# Patient Record
Sex: FEMALE | Race: WHITE | NOT HISPANIC OR LATINO | URBAN - METROPOLITAN AREA
[De-identification: names, ages, dates, MRNs, and addresses within clinical notes are randomized per-mention and may not be internally consistent; named-entity substitution may affect disease eponyms.]

---

## 2018-01-22 ENCOUNTER — EMERGENCY (EMERGENCY)
Facility: HOSPITAL | Age: 28
LOS: 0 days | Discharge: HOME | End: 2018-01-22
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
## 2018-01-22 DIAGNOSIS — S39.012A STRAIN OF MUSCLE, FASCIA AND TENDON OF LOWER BACK, INITIAL ENCOUNTER: ICD-10-CM

## 2018-01-22 DIAGNOSIS — M54.5 LOW BACK PAIN: ICD-10-CM

## 2018-02-01 ENCOUNTER — TRANSCRIPTION ENCOUNTER (OUTPATIENT)
Age: 28
End: 2018-02-01

## 2018-11-12 ENCOUNTER — APPOINTMENT (OUTPATIENT)
Dept: OBGYN | Facility: CLINIC | Age: 28
End: 2018-11-12

## 2018-11-12 ENCOUNTER — OUTPATIENT (OUTPATIENT)
Dept: OUTPATIENT SERVICES | Facility: HOSPITAL | Age: 28
LOS: 1 days | Discharge: HOME | End: 2018-11-12

## 2018-11-12 VITALS
SYSTOLIC BLOOD PRESSURE: 110 MMHG | DIASTOLIC BLOOD PRESSURE: 70 MMHG | WEIGHT: 120 LBS | HEIGHT: 60 IN | BODY MASS INDEX: 23.56 KG/M2

## 2018-11-12 DIAGNOSIS — E03.9 HYPOTHYROIDISM, UNSPECIFIED: ICD-10-CM

## 2018-11-12 DIAGNOSIS — Z78.9 OTHER SPECIFIED HEALTH STATUS: ICD-10-CM

## 2018-11-12 DIAGNOSIS — Z00.00 ENCOUNTER FOR GENERAL ADULT MEDICAL EXAMINATION WITHOUT ABNORMAL FINDINGS: ICD-10-CM

## 2018-11-12 DIAGNOSIS — S31.40XA UNSPECIFIED OPEN WOUND OF VAGINA AND VULVA, INITIAL ENCOUNTER: ICD-10-CM

## 2018-11-12 RX ORDER — BENZETHONIUM CHLORIDE 0.2 %
20-0.5 FILM-FORMING LIQUID WITH APPLICATOR TOPICAL
Qty: 1 | Refills: 0 | Status: ACTIVE | COMMUNITY
Start: 2018-11-12 | End: 1900-01-01

## 2018-11-12 RX ORDER — IBUPROFEN 600 MG/1
600 TABLET, FILM COATED ORAL EVERY 6 HOURS
Qty: 20 | Refills: 0 | Status: ACTIVE | COMMUNITY
Start: 2018-11-12 | End: 1900-01-01

## 2018-11-13 PROBLEM — Z78.9 NON-SMOKER: Status: ACTIVE | Noted: 2018-11-13

## 2018-11-13 PROBLEM — E03.9 HYPOTHYROIDISM: Status: ACTIVE | Noted: 2018-11-13

## 2018-11-18 LAB — BACTERIA GENITAL AEROBE CULT: NORMAL

## 2018-12-10 LAB
A VAGINAE DNA VAG QL NAA+PROBE: NORMAL
BVAB2 DNA VAG QL NAA+PROBE: NORMAL
C KRUSEI DNA VAG QL NAA+PROBE: NORMAL
C TRACH RRNA SPEC QL NAA+PROBE: POSITIVE
MEGA1 DNA VAG QL NAA+PROBE: NORMAL
N GONORRHOEA RRNA SPEC QL NAA+PROBE: NEGATIVE
T VAGINALIS RRNA SPEC QL NAA+PROBE: NEGATIVE

## 2019-03-24 ENCOUNTER — LABORATORY RESULT (OUTPATIENT)
Age: 29
End: 2019-03-24

## 2019-03-25 ENCOUNTER — LABORATORY RESULT (OUTPATIENT)
Age: 29
End: 2019-03-25

## 2019-03-25 ENCOUNTER — OUTPATIENT (OUTPATIENT)
Dept: OUTPATIENT SERVICES | Facility: HOSPITAL | Age: 29
LOS: 1 days | Discharge: HOME | End: 2019-03-25

## 2019-03-25 ENCOUNTER — APPOINTMENT (OUTPATIENT)
Dept: OBGYN | Facility: CLINIC | Age: 29
End: 2019-03-25

## 2019-03-25 VITALS
BODY MASS INDEX: 22.58 KG/M2 | DIASTOLIC BLOOD PRESSURE: 74 MMHG | SYSTOLIC BLOOD PRESSURE: 122 MMHG | WEIGHT: 115 LBS | HEIGHT: 60 IN | HEART RATE: 82 BPM

## 2019-03-25 DIAGNOSIS — Z00.00 ENCOUNTER FOR GENERAL ADULT MEDICAL EXAMINATION WITHOUT ABNORMAL FINDINGS: ICD-10-CM

## 2019-03-25 DIAGNOSIS — Z00.00 ENCOUNTER FOR GENERAL ADULT MEDICAL EXAMINATION W/OUT ABNORMAL FINDINGS: ICD-10-CM

## 2019-03-25 RX ORDER — VALACYCLOVIR 500 MG/1
500 TABLET, FILM COATED ORAL DAILY
Qty: 30 | Refills: 11 | Status: ACTIVE | COMMUNITY
Start: 2019-03-25 | End: 1900-01-01

## 2019-03-25 RX ORDER — VALACYCLOVIR 500 MG/1
500 TABLET, FILM COATED ORAL TWICE DAILY
Qty: 10 | Refills: 0 | Status: ACTIVE | COMMUNITY
Start: 2019-03-25 | End: 1900-01-01

## 2019-03-25 NOTE — PHYSICAL EXAM
[Normal] : cervix [Motion Tenderness] : there was no cervical motion tenderness [Ulcer ___ cm] : a [unfilled] ~Ucm ulcer [FreeTextEntry5] : no lesions

## 2019-03-27 ENCOUNTER — LABORATORY RESULT (OUTPATIENT)
Age: 29
End: 2019-03-27

## 2019-03-27 ENCOUNTER — OUTPATIENT (OUTPATIENT)
Dept: OUTPATIENT SERVICES | Facility: HOSPITAL | Age: 29
LOS: 1 days | Discharge: HOME | End: 2019-03-27

## 2019-03-27 DIAGNOSIS — Z00.00 ENCOUNTER FOR GENERAL ADULT MEDICAL EXAMINATION WITHOUT ABNORMAL FINDINGS: ICD-10-CM

## 2019-04-08 ENCOUNTER — APPOINTMENT (OUTPATIENT)
Dept: OBGYN | Facility: CLINIC | Age: 29
End: 2019-04-08

## 2019-07-12 ENCOUNTER — TRANSCRIPTION ENCOUNTER (OUTPATIENT)
Age: 29
End: 2019-07-12

## 2021-07-06 ENCOUNTER — TRANSCRIPTION ENCOUNTER (OUTPATIENT)
Age: 31
End: 2021-07-06

## 2021-07-08 ENCOUNTER — NON-APPOINTMENT (OUTPATIENT)
Age: 31
End: 2021-07-08

## 2021-07-08 ENCOUNTER — APPOINTMENT (OUTPATIENT)
Dept: OBGYN | Facility: CLINIC | Age: 31
End: 2021-07-08
Payer: COMMERCIAL

## 2021-07-08 ENCOUNTER — RESULT REVIEW (OUTPATIENT)
Age: 31
End: 2021-07-08

## 2021-07-08 VITALS
HEIGHT: 60 IN | DIASTOLIC BLOOD PRESSURE: 60 MMHG | WEIGHT: 115 LBS | SYSTOLIC BLOOD PRESSURE: 115 MMHG | BODY MASS INDEX: 22.58 KG/M2

## 2021-07-08 PROCEDURE — 99072 ADDL SUPL MATRL&STAF TM PHE: CPT

## 2021-07-08 PROCEDURE — 99395 PREV VISIT EST AGE 18-39: CPT

## 2021-07-08 RX ORDER — PRENATAL VIT NO.130/IRON/FOLIC 27MG-0.8MG
27-0.8 TABLET ORAL DAILY
Qty: 84 | Refills: 3 | Status: ACTIVE | COMMUNITY
Start: 2021-07-08 | End: 1900-01-01

## 2021-07-14 NOTE — HISTORY OF PRESENT ILLNESS
[FreeTextEntry1] : 32 y/o G0 LMP 6/25/21 here for routine annual as well as axillary mass which she noticed two days ago. At the time which she noticed it, she went to an urgent care center. They told her that she should have breast imaging done and follow up with a breast surgeon. Patient states that since then the lump has regressed and pain has resolved. She can't feel it anymore. She is not using any birth control as she desires to conceive. She has history of hypothyroidism but hasn't seen an endocrinologist in a long time. No other complaints. [Patient reported PAP Smear was normal] : Patient reported PAP Smear was normal [Y] : Patient is sexually active [N] : Patient denies prior pregnancies [PapSmeardate] : 11/9/18 [TextBox_31] : NILM/HPV NEG [LMPDate] : 6/25/21 [MensesFreq] : 28

## 2021-07-14 NOTE — PHYSICAL EXAM
[Appropriately responsive] : appropriately responsive [Alert] : alert [No Acute Distress] : no acute distress [Soft] : soft [Non-tender] : non-tender [Non-distended] : non-distended [Examination Of The Breasts] : a normal appearance [No Masses] : no breast masses were palpable [Normal] : normal [Uterine Adnexae] : normal

## 2021-07-14 NOTE — DISCUSSION/SUMMARY
[FreeTextEntry1] : 30 y/o G0 with normal annual and resolved breast mass\par - Send for breast ultrasound/mammogram for further assessment\par - Pap/HPV done\par - Aptima swab and STI panel sent\par - TSH/Free T4 ordered\par -Endocrinology referral given\par - Prenatal vitamins prescribed\par - RTc 1 year or prn

## 2021-07-20 LAB
A VAGINAE DNA VAG QL NAA+PROBE: NORMAL
BVAB2 DNA VAG QL NAA+PROBE: NORMAL
C KRUSEI DNA VAG QL NAA+PROBE: NEGATIVE
C TRACH RRNA SPEC QL NAA+PROBE: NEGATIVE
CYTOLOGY CVX/VAG DOC THIN PREP: NORMAL
HPV HIGH+LOW RISK DNA PNL CVX: NOT DETECTED
MEGA1 DNA VAG QL NAA+PROBE: NORMAL
N GONORRHOEA RRNA SPEC QL NAA+PROBE: NEGATIVE
T VAGINALIS RRNA SPEC QL NAA+PROBE: NEGATIVE

## 2021-08-02 ENCOUNTER — RESULT REVIEW (OUTPATIENT)
Age: 31
End: 2021-08-02

## 2021-08-02 ENCOUNTER — OUTPATIENT (OUTPATIENT)
Dept: OUTPATIENT SERVICES | Facility: HOSPITAL | Age: 31
LOS: 1 days | Discharge: HOME | End: 2021-08-02
Payer: COMMERCIAL

## 2021-08-02 DIAGNOSIS — R92.8 OTHER ABNORMAL AND INCONCLUSIVE FINDINGS ON DIAGNOSTIC IMAGING OF BREAST: ICD-10-CM

## 2021-08-02 PROCEDURE — G0279: CPT | Mod: 26

## 2021-08-02 PROCEDURE — 76641 ULTRASOUND BREAST COMPLETE: CPT | Mod: 26,50

## 2021-08-02 PROCEDURE — 77066 DX MAMMO INCL CAD BI: CPT | Mod: 26

## 2022-06-30 ENCOUNTER — APPOINTMENT (OUTPATIENT)
Dept: OBGYN | Facility: CLINIC | Age: 32
End: 2022-06-30

## 2022-06-30 VITALS
HEIGHT: 60 IN | DIASTOLIC BLOOD PRESSURE: 70 MMHG | SYSTOLIC BLOOD PRESSURE: 120 MMHG | WEIGHT: 122 LBS | BODY MASS INDEX: 23.95 KG/M2

## 2022-06-30 DIAGNOSIS — N91.1 SECONDARY AMENORRHEA: ICD-10-CM

## 2022-06-30 PROCEDURE — 76815 OB US LIMITED FETUS(S): CPT

## 2022-06-30 PROCEDURE — 81025 URINE PREGNANCY TEST: CPT

## 2022-06-30 PROCEDURE — 99213 OFFICE O/P EST LOW 20 MIN: CPT | Mod: 25

## 2022-06-30 RX ORDER — PYRIDOXINE HCL (VITAMIN B6) 25 MG
25 TABLET ORAL
Qty: 60 | Refills: 3 | Status: ACTIVE | COMMUNITY
Start: 2022-06-30 | End: 1900-01-01

## 2022-06-30 RX ORDER — DOXYLAMINE SUCCINATE 25 MG/1
25 TABLET ORAL
Qty: 30 | Refills: 2 | Status: ACTIVE | COMMUNITY
Start: 2022-06-30 | End: 1900-01-01

## 2022-07-01 ENCOUNTER — EMERGENCY (EMERGENCY)
Facility: HOSPITAL | Age: 32
LOS: 0 days | Discharge: HOME | End: 2022-07-01
Attending: EMERGENCY MEDICINE | Admitting: EMERGENCY MEDICINE

## 2022-07-01 VITALS
HEART RATE: 116 BPM | WEIGHT: 121.92 LBS | SYSTOLIC BLOOD PRESSURE: 131 MMHG | DIASTOLIC BLOOD PRESSURE: 81 MMHG | TEMPERATURE: 98 F | RESPIRATION RATE: 18 BRPM | OXYGEN SATURATION: 98 %

## 2022-07-01 VITALS
HEART RATE: 84 BPM | OXYGEN SATURATION: 98 % | TEMPERATURE: 98 F | DIASTOLIC BLOOD PRESSURE: 53 MMHG | RESPIRATION RATE: 19 BRPM | SYSTOLIC BLOOD PRESSURE: 111 MMHG

## 2022-07-01 DIAGNOSIS — E03.9 HYPOTHYROIDISM, UNSPECIFIED: ICD-10-CM

## 2022-07-01 DIAGNOSIS — O20.9 HEMORRHAGE IN EARLY PREGNANCY, UNSPECIFIED: ICD-10-CM

## 2022-07-01 DIAGNOSIS — R10.30 LOWER ABDOMINAL PAIN, UNSPECIFIED: ICD-10-CM

## 2022-07-01 DIAGNOSIS — O03.9 COMPLETE OR UNSPECIFIED SPONTANEOUS ABORTION WITHOUT COMPLICATION: ICD-10-CM

## 2022-07-01 LAB
ABO RH CONFIRMATION: SIGNIFICANT CHANGE UP
ALBUMIN SERPL ELPH-MCNC: 4.1 G/DL — SIGNIFICANT CHANGE UP (ref 3.5–5.2)
ALP SERPL-CCNC: 36 U/L — SIGNIFICANT CHANGE UP (ref 30–115)
ALT FLD-CCNC: 15 U/L — SIGNIFICANT CHANGE UP (ref 0–41)
ANION GAP SERPL CALC-SCNC: 15 MMOL/L — HIGH (ref 7–14)
APPEARANCE UR: CLEAR — SIGNIFICANT CHANGE UP
AST SERPL-CCNC: 22 U/L — SIGNIFICANT CHANGE UP (ref 0–41)
BACTERIA # UR AUTO: NEGATIVE — SIGNIFICANT CHANGE UP
BASOPHILS # BLD AUTO: 0.04 K/UL — SIGNIFICANT CHANGE UP (ref 0–0.2)
BASOPHILS NFR BLD AUTO: 0.4 % — SIGNIFICANT CHANGE UP (ref 0–1)
BILIRUB SERPL-MCNC: <0.2 MG/DL — SIGNIFICANT CHANGE UP (ref 0.2–1.2)
BILIRUB UR-MCNC: NEGATIVE — SIGNIFICANT CHANGE UP
BLD GP AB SCN SERPL QL: SIGNIFICANT CHANGE UP
BUN SERPL-MCNC: 10 MG/DL — SIGNIFICANT CHANGE UP (ref 10–20)
CALCIUM SERPL-MCNC: 9 MG/DL — SIGNIFICANT CHANGE UP (ref 8.5–10.1)
CHLORIDE SERPL-SCNC: 97 MMOL/L — LOW (ref 98–110)
CO2 SERPL-SCNC: 22 MMOL/L — SIGNIFICANT CHANGE UP (ref 17–32)
COLOR SPEC: COLORLESS — SIGNIFICANT CHANGE UP
CREAT SERPL-MCNC: 0.7 MG/DL — SIGNIFICANT CHANGE UP (ref 0.7–1.5)
DIFF PNL FLD: ABNORMAL
EGFR: 118 ML/MIN/1.73M2 — SIGNIFICANT CHANGE UP
EOSINOPHIL # BLD AUTO: 0.21 K/UL — SIGNIFICANT CHANGE UP (ref 0–0.7)
EOSINOPHIL NFR BLD AUTO: 2 % — SIGNIFICANT CHANGE UP (ref 0–8)
EPI CELLS # UR: 1 /HPF — SIGNIFICANT CHANGE UP (ref 0–5)
GLUCOSE SERPL-MCNC: 110 MG/DL — HIGH (ref 70–99)
GLUCOSE UR QL: SIGNIFICANT CHANGE UP
HCG SERPL-ACNC: 4121 MIU/ML — HIGH
HCT VFR BLD CALC: 36.6 % — LOW (ref 37–47)
HGB BLD-MCNC: 12.4 G/DL — SIGNIFICANT CHANGE UP (ref 12–16)
HYALINE CASTS # UR AUTO: 3 /LPF — SIGNIFICANT CHANGE UP (ref 0–7)
IMM GRANULOCYTES NFR BLD AUTO: 0.6 % — HIGH (ref 0.1–0.3)
KETONES UR-MCNC: NEGATIVE — SIGNIFICANT CHANGE UP
LEUKOCYTE ESTERASE UR-ACNC: NEGATIVE — SIGNIFICANT CHANGE UP
LYMPHOCYTES # BLD AUTO: 1.19 K/UL — LOW (ref 1.2–3.4)
LYMPHOCYTES # BLD AUTO: 11.5 % — LOW (ref 20.5–51.1)
MCHC RBC-ENTMCNC: 26.9 PG — LOW (ref 27–31)
MCHC RBC-ENTMCNC: 33.9 G/DL — SIGNIFICANT CHANGE UP (ref 32–37)
MCV RBC AUTO: 79.4 FL — LOW (ref 81–99)
MONOCYTES # BLD AUTO: 0.79 K/UL — HIGH (ref 0.1–0.6)
MONOCYTES NFR BLD AUTO: 7.6 % — SIGNIFICANT CHANGE UP (ref 1.7–9.3)
NEUTROPHILS # BLD AUTO: 8.07 K/UL — HIGH (ref 1.4–6.5)
NEUTROPHILS NFR BLD AUTO: 77.9 % — HIGH (ref 42.2–75.2)
NITRITE UR-MCNC: NEGATIVE — SIGNIFICANT CHANGE UP
NRBC # BLD: 0 /100 WBCS — SIGNIFICANT CHANGE UP (ref 0–0)
PH UR: 6 — SIGNIFICANT CHANGE UP (ref 5–8)
PLATELET # BLD AUTO: 247 K/UL — SIGNIFICANT CHANGE UP (ref 130–400)
POTASSIUM SERPL-MCNC: 4 MMOL/L — SIGNIFICANT CHANGE UP (ref 3.5–5)
POTASSIUM SERPL-SCNC: 4 MMOL/L — SIGNIFICANT CHANGE UP (ref 3.5–5)
PROT SERPL-MCNC: 6.5 G/DL — SIGNIFICANT CHANGE UP (ref 6–8)
PROT UR-MCNC: NEGATIVE — SIGNIFICANT CHANGE UP
RBC # BLD: 4.61 M/UL — SIGNIFICANT CHANGE UP (ref 4.2–5.4)
RBC # FLD: 13.9 % — SIGNIFICANT CHANGE UP (ref 11.5–14.5)
RBC CASTS # UR COMP ASSIST: 5 /HPF — HIGH (ref 0–4)
SODIUM SERPL-SCNC: 134 MMOL/L — LOW (ref 135–146)
SP GR SPEC: 1.01 — SIGNIFICANT CHANGE UP (ref 1.01–1.03)
UROBILINOGEN FLD QL: SIGNIFICANT CHANGE UP
WBC # BLD: 10.36 K/UL — SIGNIFICANT CHANGE UP (ref 4.8–10.8)
WBC # FLD AUTO: 10.36 K/UL — SIGNIFICANT CHANGE UP (ref 4.8–10.8)
WBC UR QL: 0 /HPF — SIGNIFICANT CHANGE UP (ref 0–5)

## 2022-07-01 PROCEDURE — 99285 EMERGENCY DEPT VISIT HI MDM: CPT

## 2022-07-01 PROCEDURE — 76830 TRANSVAGINAL US NON-OB: CPT | Mod: 26,76

## 2022-07-01 RX ORDER — SODIUM CHLORIDE 9 MG/ML
1000 INJECTION INTRAMUSCULAR; INTRAVENOUS; SUBCUTANEOUS ONCE
Refills: 0 | Status: COMPLETED | OUTPATIENT
Start: 2022-07-01 | End: 2022-07-01

## 2022-07-01 RX ORDER — ACETAMINOPHEN 500 MG
650 TABLET ORAL ONCE
Refills: 0 | Status: COMPLETED | OUTPATIENT
Start: 2022-07-01 | End: 2022-07-01

## 2022-07-01 RX ADMIN — Medication 650 MILLIGRAM(S): at 17:37

## 2022-07-01 RX ADMIN — SODIUM CHLORIDE 1000 MILLILITER(S): 9 INJECTION INTRAMUSCULAR; INTRAVENOUS; SUBCUTANEOUS at 17:40

## 2022-07-01 RX ADMIN — SODIUM CHLORIDE 1000 MILLILITER(S): 9 INJECTION INTRAMUSCULAR; INTRAVENOUS; SUBCUTANEOUS at 17:39

## 2022-07-01 RX ADMIN — SODIUM CHLORIDE 1000 MILLILITER(S): 9 INJECTION INTRAMUSCULAR; INTRAVENOUS; SUBCUTANEOUS at 15:20

## 2022-07-01 NOTE — ED PROVIDER NOTE - OBJECTIVE STATEMENT
32-year-old female with history of hypothyroid last menstrual period May 17 currently pregnant presents to ED complaining of vaginal bleeding and lower abdominal cramping that started about an hour ago.  Patient states saw her GYN yesterday and had a normal ultrasound with positive IUP.  No nausea, no vomiting, no diarrhea, no fever, no chills, no weakness.

## 2022-07-01 NOTE — ED ADULT NURSE NOTE - OBJECTIVE STATEMENT
Pt presented to ED c/o vaginal bleeding and abd pain. Pt x7 weeks pregnant, denies n/v/d/fevers/chills. Denies trauma/falls/AC use. Pt A&Ox4, ambulatory baseline. Family at bedside.

## 2022-07-01 NOTE — CONSULT NOTE ADULT - SUBJECTIVE AND OBJECTIVE BOX
Chief Complaint: vaginal bleeding    HPI: 33yo  at 6w2d GA, EDC 22 by LMP () presenting to the ED with vaginal bleeding starting at 1pm today. Patient states it started as spotting but progressed to heavier bleeding and now cramping starting 1hr ago. Patient denies any additional symptoms including, nausea, vomiting, chest pain, SOB, LE pain or swelling. this is a planned and desired pregnancy. Patient was seen for pregnancy confirmation yesterday FHR noted.     Denies the following: constitutional symptoms, visual symptoms, cardiovascular symptoms, respiratory symptoms, GI symptoms, musculoskeletal symptoms, skin symptoms, neurologic symptoms, hematologic symptoms, allergic symptoms, psychiatric symptoms  Except any pertinent positives listed.     Ob/Gyn History:                   LMP -                   Cycle Length - regular  Denies history of ovarian cysts, uterine fibroids, abnormal paps, or STIs  Last Pap Smear - yesterday NILM per pt  Last Mammogram - never  Last Colonoscopy - never      Home Medications:  n/a    Allergies  No Known Allergies      PAST MEDICAL & SURGICAL HISTORY:  Hypothyroid    FAMILY HISTORY:      SOCIAL HISTORY: Denies cigarette use, alcohol use, or illicit drug use    Vital Signs Last 24 Hrs  T(F): 98.1 (2022 14:04), Max: 98.1 (2022 14:04)  HR: 116 (2022 14:04) (116 - 116)  BP: 131/81 (2022 14:04) (131/81 - 131/81)  RR: 18 (2022 14:04) (18 - 18)    General Appearance - AAOx3, NAD  Abdomen - Soft, nontender, nondistended, no rebound, no rigidity, no guarding, bowel sounds present  External genitalia: normal female genitalia  Vagina: 20cc of blood in the vagina mixed with tissue. slow ooze from cervix  cervix: closed, no CMT  uterus: 6 week sized, nontender  adnexa: nontender, no masses     LABS:                        12.4   10.36 )-----------( 247      ( 2022 15:05 )             36.6     HCG Quantitative, Serum: 4121.0 mIU/mL (22 @ 15:05)    ABO RH Interpretation: A NEG (22 @ 15:05)  Antibody Screen: NEG (22 @ 15:05)    07-01    134<L>  |  97<L>  |  10  ----------------------------<  110<H>  4.0   |  22  |  0.7    Ca    9.0      2022 15:05    TPro  6.5  /  Alb  4.1  /  TBili  <0.2  /  DBili  x   /  AST  22  /  ALT  15  /  AlkPhos  36        Urinalysis Basic - ( 2022 16:42 )    Color: Colorless / Appearance: Clear / S.008 / pH: x  Gluc: x / Ketone: Negative  / Bili: Negative / Urobili: <2 mg/dL   Blood: x / Protein: Negative / Nitrite: Negative   Leuk Esterase: Negative / RBC: 5 /HPF / WBC 0 /HPF   Sq Epi: x / Non Sq Epi: 1 /HPF / Bacteria: Negative          RADIOLOGY & ADDITIONAL STUDIES:  < from: US Transvaginal (22 @ 16:54) >  PROCEDURE DATE:  2022          INTERPRETATION:  CLINICAL HISTORY: Vaginal bleeding.    COMPARISON: None.    PROCEDURE: Transabdominal and transvaginal ultrasound of the pelvis was   performed, including Doppler.    LMP: 2022    FINDINGS:    The uterus measures 10.7 x 6.2 x 6.5 cm containing a single intrauterine   pregnancy with crown-rump length  measuring 0.2 cm corresponding to a   gestational age of 5 weeks and 5 days. Fetal heart rate is 110   beats/min. A yolk sac is visualized. There is a subchorionic hemorrhage   measuring 0.6 x 0.3 x 0.5 cm.    There is trace free pelvic fluid.    The right ovary measures 3.5 x 1.7 x 1.9 cm (volume 5.8 cc) contains a   1.8 x 1.5 x 1.7 cm corpus luteal cyst. The left ovary measures 3 x 1.8 x   1.7 cm (volume 4.7 cc).    Doppler flow is demonstrated to both ovaries.      IMPRESSION:  Single live intrauterine pregnancy corresponding to a gestational age of   5 weeks and 5 days. Small subchorionic hematoma. Fetal heart rate at 110   beats/min.

## 2022-07-01 NOTE — ED PROVIDER NOTE - NSFOLLOWUPCLINICS_GEN_ALL_ED_FT
An OB/GYN physician  Obstetrics & Gynecology  .  [place]   Phone:   Fax:     [facility]  OB/GYN  [address] [place] [address]  Phone: [phone]  Fax:

## 2022-07-01 NOTE — ED ADULT TRIAGE NOTE - CHIEF COMPLAINT QUOTE
pt sts is 6 weeks pregnant and is having vaginal bleeding, sts is afraid to look how much. denies crampnig/pain.

## 2022-07-01 NOTE — CONSULT NOTE ADULT - ASSESSMENT
33yo  at 6w2d GA, EDC 22 by LMP () presenting to the ED with likely misscarraige in process, currently clinically and hemodynamically stable.  -tissue removed from cervix, likely placental tissue  -recommend repeat TVUS  -patient Aneg, will need rhogam  -will discuss repeat ultrasound finding with patient once done  -reassess    Dr. Severino aware 33yo  at 6w2d GA, EDC 22 by LMP () presenting to the ED with likely misscarraige in process, currently clinically and hemodynamically stable.  -tissue removed from cervix, likely placental tissue  -recommend repeat TVUS  -patient Aneg, will need rhogam  -dispo per ED    Dr. Severino aware

## 2022-07-01 NOTE — ED ADULT NURSE NOTE - PRIMARY CARE PROVIDER
vicks vapor rub daily  Trim or file weekly    Understanding Thickened Nails  There are several causes of very thick or crumbling nails. They can be caused by injuries or pressure from shoes. Fungal infections are a common cause. Diabetes, psoriasis, or vascular disease are other possible causes.    Symptoms  Along with thickening, the nail may appear ridged, brittle, or yellowish. It may also feel painful when pressure is put on it. After a while, a thickened nail may loosen and fall off.  Evaluation  Because thickened nails may be a symptom of a medical condition, your doctor will look at your medical history. A test may be done to check for fungal infection. The thickness and color of the nail are examined carefully. This helps determine the cause.  Treatment  · For infection: Oral or topical antifungal medications may be used to treat infection. These can help prevent sores under the nail. They also keep the fungus from spreading to other nails.  · For thick nails not caused by infection: Thinning the nail may be an option. This can be done by trimming, filing, or grinding.  · For pain: If the nail causes pain, part or all of the nail can be removed with surgery. Never try to remove a nail by yourself.  Prevention  Many nail problems can be prevented by wearing the right shoes and trimming your nails properly. Keep your feet clean and dry to help avoid infection. If you have diabetes, talk with your podiatrist or primary care doctor before doing any foot self-care.  · The right shoes: Get your feet measured. Your size may change as you age. This is due to ligaments that loosen with age and allow the bones in your foot to change position or spread. Wear shoes that are supportive and roomy enough for your toes to wiggle. Look for shoes made of natural materials, such as leather, which allow your feet to breathe.  · Proper trimming: To avoid problems, trim your toenails straight across. Then, file the edges with a  nail file. If you cant trim your own nails, ask your health care provider to do so for you.  © 2705-1415 [employer]. [address] [place] [address], [place] [address]. All rights reserved. This information is not intended as a substitute for professional medical care. Always follow your healthcare professional's instructions.        Diabetes: Inspecting Your Feet  Diabetes increases your chances of developing foot problems. So inspect your feet every day. This helps you find small skin irritations before they become serious infections. If you have trouble seeing the bottoms of your feet, use a mirror or ask a family member or friend to help.    How to check your feet  Below are tips to help you look for foot problems. Try to check your feet at the same time each day, such as when you get out of bed in the morning:  · Check the top of each foot. The tops of toes, back of the heel, and outer edge of the foot can get a lot of rubbing from poor-fitting shoes.  · Check the bottom of each foot. Daily wear and tear often leads to problems at pressure spots.  · Check the toes and nails. Fungal infections often occur between toes. Toenail problems can also be a sign of fungal infections or lead to breaks in the skin.  · Check your shoes, too. Loose objects inside a shoe can injure the foot. Use your hand to feel inside your shoes for things like jaycee, loose stitching, or rough areas that could irritate your skin.  Warning signs  Look for any color changes in the foot. Redness with streaks can signal a severe infection, which needs immediate medical attention. Tell your doctor right away if you have any of these problems:  · Swelling, sometimes with color changes, may be a sign of poor blood flow or infection. Symptoms include tenderness and an increase in the size of your foot.  · Warm or hot areas on your feet may be signs of infection. A foot that is cold may not be getting enough blood.  · Sensations such as  burning, tingling, or pins and needles can be signs of a problem. Also check for areas that may be numb.  · Hot spots are caused by friction or pressure. Look for hot spots in areas that get a lot of rubbing. Hot spots can turn into blisters, calluses, or sores.  · Cracks and sores are caused by dry or irritated skin. They are a sign that the skin is breaking down, which can lead to infection.  · Toenail problems to watch for include nails growing into the skin (ingrown toenail) and causing redness or pain. Thick, yellow, or discolored nails can signal a fungal infection.  · Drainage and odor can develop from untreated sores and ulcers. Call your doctor right away if you notice white or yellow drainage, bleeding, or unpleasant odor.   © 1529-1532 [employer]. [address] [place] [address]. All rights reserved. This information is not intended as a substitute for professional medical care. Always follow your healthcare professional's instructions.    Diabetic Foot Care    Diabetes can lead to a number of different foot complications. Fortunately, most of these complications can be prevented with a little extra foot care. If diabetes is not well controlled, the high blood sugar can cause damage to blood vessels and result in poor circulation to the foot. When the skin does not get enough blood flow, it becomes prone to pressure sores and ulcers, which heal slowly.  High blood sugar can also damage nerves, interfering with the ability to feel pain and pressure. When you cant feel your foot normally, it is easy to injure your skin, bones and joints without knowing it. For these reasons diabetes increases the risk of fungal infections, bunions and ulcers. Deep ulcers can lead to bone infection. Gangrene is the most serious foot complication of diabetes. It usually occurs on the tips of the toes as blacked areas of skin. The black area is dead tissue. In severe cases, gangrene spreads to  MD involve the entire toe, other toes and the entire foot. Foot or toe amputation may be required. Good foot care and blood sugar control can prevent this.    Home Care  1. Wear comfortable, proper fitting shoes.  2. Wash your feet daily with warm water and mild soap.  3. After drying, apply a moisturizing cream or lotion.  4. Check your feet daily for skin breaks, blisters, swelling, or redness. Look between your toes also.  5. Wear cotton socks and change them every day.  6. Trim toe nails carefully and do not cut your cuticles.  7. Strive to keep your blood sugar under control with a combination of medicines, diet and activity.  8. If you smoke and have diabetes, it is very important that you stop. Smoking reduces blood flow to your foot.  9. Avoid activities that increase your risk of foot injury:  · Do not walk barefoot.  · Do not use heating pads or hot water bottles on your feet.  · Do not put your foot in a hot tub without first checking the temperature with your hand.  10) Schedule yearly foot exams.    Follow Up  with your doctor or as advised by our staff. Report any cut, puncture, scrape, other injury, blister, ingrown toenail or ulcer on your foot.    Get Prompt Medical Attention  if any of the following occur:  -- Open ulcer with pus draining from the wound  -- Increasing foot or leg pain  -- New areas of redness or swelling or tender areas of the foot    © 6966-6336 [employer]. [address] [place] [address], [place] [address]. All rights reserved. This information is not intended as a substitute for professional medical care. Always follow your healthcare professional's instructions.

## 2022-07-01 NOTE — ED ADULT NURSE REASSESSMENT NOTE - NS ED NURSE REASSESS COMMENT FT1
Patient assessed bedside.  A/O x 4, no s/s of acute pain or distress at this time.  Pt awaiting repeat US.  Safety and comfort maintained.

## 2022-07-01 NOTE — ED PROVIDER NOTE - PATIENT PORTAL LINK FT
You can access the FollowMyHealth Patient Portal offered by [facility] by registering at the following website: [email or web address]. By joining [facility]’s FollowMyHealth portal, you will also be able to view your health information using other applications (apps) compatible with our system.

## 2022-07-01 NOTE — ED PROVIDER NOTE - CLINICAL SUMMARY MEDICAL DECISION MAKING FREE TEXT BOX
Patient was evaluated for miscarriage.  Initial ultrasound showed normal IUP however subsequently patient had increased bleeding and products of conception being expelled repeat ultrasound showed completed miscarriage OB was consulted patient was Rh- so RhoGAM was given return precautions were discussed with the patient

## 2022-07-01 NOTE — ED ADULT NURSE NOTE - CAS ELECT INFOMATION PROVIDED
Take biaxin twice daily for 10 days  Take with food and eat yogurt or a probiotic daily to decrease GI upset  Flonase daily  Sinus rinses, tylenol, and motrin  Follow up with your PCP for persistent symptoms  Go to the ER for any distress  follow up/DC instructions

## 2022-07-01 NOTE — ED PROVIDER NOTE - ATTENDING APP SHARED VISIT CONTRIBUTION OF CARE
33 yo f , approx 6 weeks preg, LMP  with pmh of hypothyroidism, presents with c/o vaginal bleeding.  pt says started spotting about an hour ago. pt says becoming heavier now.  saw dr. behouth yesterday and had bedside US showing iup with fhr, approx 6 weeks gestation.  pt admits mild cramping.  no urinary sx.  exam: nad, ncat, perrl, eomi, mmm, rrr, ctab, abd soft, nt, nd aox3, imp: pt with first trimester bleeding, threatened ab, labs and us

## 2022-07-01 NOTE — ED PROVIDER NOTE - PROGRESS NOTE DETAILS
Upon return from US patient states pain and bleeding have worsened. Patient given tylenol and fluids. GYN consult called. pt rec'd rhogam, tolerated well. will observe and DC

## 2022-07-01 NOTE — ED PROVIDER NOTE - NS ED ATTENDING STATEMENT MOD
This was a shared visit with the NEHEMIAH. I reviewed and verified the documentation and independently performed the documented:

## 2022-07-06 ENCOUNTER — NON-APPOINTMENT (OUTPATIENT)
Age: 32
End: 2022-07-06

## 2022-07-06 LAB
HCG UR QL: POSITIVE
QUALITY CONTROL: YES

## 2022-07-28 ENCOUNTER — APPOINTMENT (OUTPATIENT)
Dept: OBGYN | Facility: CLINIC | Age: 32
End: 2022-07-28

## 2023-06-26 ENCOUNTER — NON-APPOINTMENT (OUTPATIENT)
Age: 33
End: 2023-06-26

## 2023-07-03 PROBLEM — E03.9 HYPOTHYROIDISM, UNSPECIFIED: Chronic | Status: ACTIVE | Noted: 2022-07-01

## 2023-07-19 ENCOUNTER — OUTPATIENT (OUTPATIENT)
Dept: OUTPATIENT SERVICES | Facility: HOSPITAL | Age: 33
LOS: 1 days | End: 2023-07-19
Payer: COMMERCIAL

## 2023-07-19 DIAGNOSIS — Z00.8 ENCOUNTER FOR OTHER GENERAL EXAMINATION: ICD-10-CM

## 2023-07-19 DIAGNOSIS — N97.9 FEMALE INFERTILITY, UNSPECIFIED: ICD-10-CM

## 2023-07-19 PROCEDURE — 74740 X-RAY FEMALE GENITAL TRACT: CPT

## 2023-07-19 PROCEDURE — 58340 CATHETER FOR HYSTEROGRAPHY: CPT

## 2023-07-19 PROCEDURE — 74740 X-RAY FEMALE GENITAL TRACT: CPT | Mod: 26

## 2023-07-20 DIAGNOSIS — N97.9 FEMALE INFERTILITY, UNSPECIFIED: ICD-10-CM

## 2023-07-27 ENCOUNTER — APPOINTMENT (OUTPATIENT)
Dept: OBGYN | Facility: CLINIC | Age: 33
End: 2023-07-27
Payer: COMMERCIAL

## 2023-07-27 VITALS — WEIGHT: 116 LBS | BODY MASS INDEX: 22.78 KG/M2 | HEIGHT: 60 IN

## 2023-07-27 DIAGNOSIS — Z01.419 ENCOUNTER FOR GYNECOLOGICAL EXAMINATION (GENERAL) (ROUTINE) W/OUT ABNORMAL FINDINGS: ICD-10-CM

## 2023-07-27 PROCEDURE — 99395 PREV VISIT EST AGE 18-39: CPT

## 2023-08-01 LAB — HPV HIGH+LOW RISK DNA PNL CVX: NOT DETECTED

## 2023-08-02 LAB — CYTOLOGY CVX/VAG DOC THIN PREP: NORMAL

## 2024-04-03 NOTE — ED PROVIDER NOTE - IV ALTEPLASE INCLUSION HIDDEN
show
Treatment Goal Met?: no
Treatment Goal Explanation (Does Not Render In The Note): Stable for the purposes of categorizing medical decision making is defined by the specific treatment goals for an individual patient. A patient that is not at their treatment goal is not stable, even if the condition has not changed and there is no short- term threat to life or function.

## 2024-04-23 ENCOUNTER — EMERGENCY (EMERGENCY)
Facility: HOSPITAL | Age: 34
LOS: 0 days | Discharge: HOME | End: 2024-04-23
Attending: STUDENT IN AN ORGANIZED HEALTH CARE EDUCATION/TRAINING PROGRAM
Payer: COMMERCIAL

## 2024-04-23 VITALS
WEIGHT: 119.93 LBS | TEMPERATURE: 98 F | SYSTOLIC BLOOD PRESSURE: 119 MMHG | OXYGEN SATURATION: 98 % | RESPIRATION RATE: 17 BRPM | HEART RATE: 71 BPM | DIASTOLIC BLOOD PRESSURE: 59 MMHG

## 2024-04-23 DIAGNOSIS — O09.811 SUPERVISION OF PREGNANCY RESULTING FROM ASSISTED REPRODUCTIVE TECHNOLOGY, FIRST TRIMESTER: ICD-10-CM

## 2024-04-23 DIAGNOSIS — Z29.13 ENCOUNTER FOR PROPHYLACTIC RHO(D) IMMUNE GLOBULIN: ICD-10-CM

## 2024-04-23 DIAGNOSIS — Z3A.08 8 WEEKS GESTATION OF PREGNANCY: ICD-10-CM

## 2024-04-23 LAB — BLD GP AB SCN SERPL QL: SIGNIFICANT CHANGE UP

## 2024-04-23 PROCEDURE — 86850 RBC ANTIBODY SCREEN: CPT

## 2024-04-23 PROCEDURE — 99283 EMERGENCY DEPT VISIT LOW MDM: CPT | Mod: 25

## 2024-04-23 PROCEDURE — 86901 BLOOD TYPING SEROLOGIC RH(D): CPT

## 2024-04-23 PROCEDURE — 86900 BLOOD TYPING SEROLOGIC ABO: CPT

## 2024-04-23 PROCEDURE — 36415 COLL VENOUS BLD VENIPUNCTURE: CPT

## 2024-04-23 PROCEDURE — 96372 THER/PROPH/DIAG INJ SC/IM: CPT

## 2024-04-23 PROCEDURE — 99285 EMERGENCY DEPT VISIT HI MDM: CPT

## 2024-04-23 PROCEDURE — 90384 RH IG FULL-DOSE IM: CPT

## 2024-04-23 NOTE — ED ADULT NURSE NOTE - NSFALLUNIVINTERV_ED_ALL_ED
Bed/Stretcher in lowest position, wheels locked, appropriate side rails in place/Call bell, personal items and telephone in reach/Instruct patient to call for assistance before getting out of bed/chair/stretcher/Non-slip footwear applied when patient is off stretcher/[place] to call system/Physically safe environment - no spills, clutter or unnecessary equipment/Purposeful proactive rounding/Room/bathroom lighting operational, light cord in reach

## 2024-04-23 NOTE — ED ADULT NURSE NOTE - PATIENT'S PREFERRED PRONOUN
[FreeTextEntry1] : 1.diabetes mellitus type 2: Recent hemoglobin A1c doing well.  Follow-up in 3 months.\par Pt advised to keep diabetic diet, decrease carbs and increase dietary protein intake.\par Exercise as tolerated 3-4 times a week.\par \par 2.elevated TSH: Recheck TFTs, previous TSH normal but now with loose bm.\par \par 3.hypertension: Continue on clonidine and losartan and metoprolol.  Follow-up with cardiology.\par Check blood pressure daily, if greater than 150/90, call MD. Keep 2 gm low sodium diet, exercise as tolerated.\par \par 4.Renal insufficiency: check bmp, avoid nsaids and c/w hydration. \par \par 5.Loose BM: spoke with aide about dietary changes to make and if no improvement will need stool studies and GI referral. \par \par \par  Her/She

## 2024-04-23 NOTE — ED PROVIDER NOTE - DISPOSITION TYPE
Brief Operative Note    Patient: Storm Santoro 59 year old male    MRN: [id number]    Surgeon(s): Ruba Ca MD  Phone Number: [phone]                       Surgeon(s) and Role:     * Ruba Ca MD - Primary    Assistant(s): Kathleen Mera PA-C    Pre-Op Diagnosis: Closed fracture of right ankle, initial encounter [S11.478O]     Post-Op Diagnosis: Closed fracture of right ankle     Procedure: Procedure(s):  Right ankle open reduction internal fixation with deltoid ligament repair    Anesthesia Type: General                                   Complications: None    Description: Right ankle open reduction internal fixation with deltoid ligament repair    Findings: Same    Specimens Removed: No specimens collected     Estimated Blood Loss: 20 mL    Assistant Tasks:  Retraction, Closing, and Splinting     Implants:   Implant Name Type Inv. Item Serial No.  Lot No. LRB No. Used Action   ANCHOR SUT 1 NDL FIBERTAK FBRWR STRL LF DISP - S. [place] ANCHOR SUT 1 NDL FIBERTAK FBRWR STRL LF DISP . [employer] [id number] Right 1 Implanted   SCREW BN 2.7MM 5MM 16MM 2.5MM FULL THRD SELF TAP SM HEX SCKT - S. Screw SCREW BN 2.7MM 5MM 16MM 2.5MM FULL THRD SELF TAP SM HEX SCKT . [employer] . Right 1 Implanted   SCREW BN 3.5MM 14MM 2.5MM FULL THRD SELF TAP LOWPRFL SM HEX - S. Screw SCREW BN 3.5MM 14MM 2.5MM FULL THRD SELF TAP LOWPRFL SM HEX . [employer] . Right 2 Implanted   SCREW BN 3.5MM 16MM 2.5MM FULL THRD SELF TAP LOWPRFL SM HEX - S. Screw SCREW BN 3.5MM 16MM 2.5MM FULL THRD SELF TAP LOWPRFL SM HEX . [employer] . Right 1 Implanted   SCREW BN 4MM 6MM 14MM 2.5MM FULL THRD SM HEX SCKT SS NS CANC - S. Screw SCREW BN 4MM 6MM 14MM 2.5MM FULL THRD SM HEX SCKT SS NS CANC . [employer] . Right 1 Implanted   SCREW BN 4MM 6MM 18MM 2.5MM FULL THRD SM HEX SCKT SS NS CANC - S. Screw SCREW BN 4MM 6MM 18MM 2.5MM FULL THRD SM HEX SCKT SS NS CANC . [employer] . Right 2  Implanted   PLATE BN [id number] HOLE COLLAR 2.73.5MM ADONAY 3.5MM LOCK - S. Plate PLATE BN [id number] HOLE COLLAR 2.73.5MM ADONAY 3.5MM LOCK . [employer] Trauma . Right 1 Implanted         I was present for the key portions of the procedure and was immediately available for the non-key portions         DISCHARGE

## 2024-04-23 NOTE — ED PROVIDER NOTE - OBJECTIVE STATEMENT
34-year-old female A1 with past medical history of miscarriage 2 years ago presents to the ED for RhoGAM injection.  Patient is 8 weeks pregnant via IVF.  Has been following closely with her fertility clinic.  Patient was at the clinic today where she had routine blood work done that was unremarkable.  Patient disclosed to the provider that she had 1 isolated episode of brown-colored discharge 2 days ago that has not recurred and they instructed her to go to the ED for a RhoGAM injection as they do not have RhoGAM at their facility.  Patient has not had any subsequent episodes of brown-colored discharge, vaginal bleeding, or abdominal/pelvic pain.  Patient has not had any recent fever, headache, dizziness, chest pain, difficulty breathing, nausea, vomiting, or diarrhea. 34-year-old female A1 with past medical history of miscarriage 2 years ago presents to the ED for RhoGAM injection.  Patient is 8 weeks pregnant via IVF.  Has been following closely with her fertility clinic.  Patient was at the clinic today where she had routine blood work done that was unremarkable.  Patient disclosed to the provider that she had 1 isolated episode of brown-colored discharge 2 days ago that has not recurred and they instructed her to go to the ED for a RhoGAM injection as they do not have RhoGAM at their facility.  Patient has not had any subsequent episodes of brown-colored discharge, vaginal bleeding, or abdominal/pelvic pain.  Patient has not had any recent fever, headache, dizziness, chest pain, difficulty breathing, nausea, vomiting, or diarrhea.    OBGYN: Dr. Lorri White

## 2024-04-23 NOTE — ED ADULT NURSE NOTE - OBJECTIVE STATEMENT
Pt 8 weeks pregnant coming to ER for RhoGAM. States she also had vaginal spotting today that was evaluated by her OBGYN

## 2024-04-23 NOTE — ED PROVIDER NOTE - NSFOLLOWUPINSTRUCTIONS_ED_ALL_ED_FT
Follow up with your OBGYN.      First Trimester of Pregnancy    The first trimester of pregnancy starts on the first day of your last menstrual period until the end of week 12. This is also called months 1 through 3 of pregnancy.    Body changes during your first trimester  Your body goes through many changes during pregnancy. The changes usually return to normal after your baby is born.    Physical changes    You may gain or lose weight.  Your breasts may grow larger and hurt. The area around your nipples may get darker.  Dark spots or blotches may develop on your face.  You may have changes in your hair.  Health changes    You may feel like you might vomit (nauseous), and you may vomit.  You may have heartburn.  You may have headaches.  You may have trouble pooping (constipation).  Your gums may bleed.  Other changes    You may get tired easily.  You may pee (urinate) more often.  Your menstrual periods will stop.  You may not feel hungry.  You may want to eat certain kinds of food.  You may have changes in your emotions from day to day.  You may have more dreams.  Follow these instructions at home:  Medicines    Take over-the-counter and prescription medicines only as told by your doctor. Some medicines are not safe during pregnancy.  Take a prenatal vitamin that contains at least 600 micrograms (mcg) of folic acid.  Eating and drinking    Eat healthy meals that include:  Fresh fruits and vegetables.  Whole grains.  Good sources of protein, such as meat, eggs, or tofu.  Low-fat dairy products.  Avoid raw meat and unpasteurized juice, milk, and cheese.  If you feel like you may vomit, or you vomit:  Eat 4 or 5 small meals a day instead of 3 large meals.  Try eating a few soda crackers.  Drink liquids between meals instead of during meals.  You may need to take these actions to prevent or treat trouble pooping:  Drink enough fluids to keep your pee (urine) pale yellow.  Eat foods that are high in fiber. These include beans, whole grains, and fresh fruits and vegetables.  Limit foods that are high in fat and sugar. These include fried or sweet foods.  Activity    Exercise only as told by your doctor. Most people can do their usual exercise routine during pregnancy.  Stop exercising if you have cramps or pain in your lower belly (abdomen) or low back.  Do not exercise if it is too hot or too humid, or if you are in a place of great height (high altitude).  Avoid heavy lifting.  If you choose to, you may have sex unless your doctor tells you not to.  Relieving pain and discomfort    Wear a good support bra if your breasts are sore.  Rest with your legs raised (elevated) if you have leg cramps or low back pain.  If you have bulging veins (varicose veins) in your legs:  Wear support hose as told by your doctor.  Raise your feet for 15 minutes, 3–4 times a day.  Limit salt in your food.  Safety    Wear your seat belt at all times when you are in a car.  Talk with your doctor if someone is hurting you or yelling at you.  Talk with your doctor if you are feeling sad or have thoughts of hurting yourself.  Lifestyle    Do not use hot tubs, steam rooms, or saunas.  Do not douche. Do not use tampons or scented sanitary pads.  Do not use herbal medicines, illegal drugs, or medicines that are not approved by your doctor. Do not drink alcohol.  Do not smoke or use any products that contain nicotine or tobacco. If you need help quitting, ask your doctor.  Avoid cat litter boxes and soil that is used by cats. These carry germs that can cause harm to the baby and can cause a loss of your baby by miscarriage or stillbirth.  General instructions    Keep all follow-up visits. This is important.  Ask for help if you need counseling or if you need help with nutrition. Your doctor can give you advice or tell you where to go for help.  Visit your dentist. At home, brush your teeth with a soft toothbrush. Floss gently.  Write down your questions. Take them to your prenatal visits.  Where to find more information  American Pregnancy Association: americanpregnancy.org  American College of Obstetricians and Gynecologists: www.acog.org  Office on Women's Health: womenshealth.gov/pregnancy  Contact a doctor if:  You are dizzy.  You have a fever.  You have mild cramps or pressure in your lower belly.  You have a nagging pain in your belly area.  You continue to feel like you may vomit, you vomit, or you have watery poop (diarrhea) for 24 hours or longer.  You have a bad-smelling fluid coming from your vagina.  You have pain when you pee.  You are exposed to a disease that spreads from person to person, such as chickenpox, measles, Zika virus, HIV, or hepatitis.  Get help right away if:  You have spotting or bleeding from your vagina.  You have very bad belly cramping or pain.  You have shortness of breath or chest pain.  You have any kind of injury, such as from a fall or a car crash.  You have new or increased pain, swelling, or redness in an arm or leg.  Summary  The first trimester of pregnancy starts on the first day of your last menstrual period until the end of week 12 (months 1 through 3).  Eat 4 or 5 small meals a day instead of 3 large meals.  Do not smoke or use any products that contain nicotine or tobacco. If you need help quitting, ask your doctor.  Keep all follow-up visits.  This information is not intended to replace advice given to you by your health care provider. Make sure you discuss any questions you have with your health care provider.

## 2024-04-23 NOTE — ED ADULT TRIAGE NOTE - CHIEF COMPLAINT QUOTE
sent in by fertility clinic for another dose of rhogam injection  pt 8 weeks pregnant & Rh(-)  has some bloody discharge over weekend

## 2024-04-23 NOTE — ED PROVIDER NOTE - PATIENT PORTAL LINK FT
You can access the FollowMyHealth Patient Portal offered by [facility] by registering at the following website: [email or web address]. By joining [employer]’s FollowMyHealth portal, you will also be able to view your health information using other applications (apps) compatible with our system.

## 2024-05-02 ENCOUNTER — LABORATORY RESULT (OUTPATIENT)
Age: 34
End: 2024-05-02

## 2024-05-02 ENCOUNTER — APPOINTMENT (OUTPATIENT)
Dept: OBGYN | Facility: CLINIC | Age: 34
End: 2024-05-02
Payer: COMMERCIAL

## 2024-05-02 ENCOUNTER — NON-APPOINTMENT (OUTPATIENT)
Age: 34
End: 2024-05-02

## 2024-05-02 VITALS
BODY MASS INDEX: 26.31 KG/M2 | HEIGHT: 60 IN | DIASTOLIC BLOOD PRESSURE: 79 MMHG | WEIGHT: 134 LBS | SYSTOLIC BLOOD PRESSURE: 114 MMHG

## 2024-05-02 PROCEDURE — G0452: CPT | Mod: 26

## 2024-05-02 PROCEDURE — 0500F INITIAL PRENATAL CARE VISIT: CPT

## 2024-05-03 PROCEDURE — 86077 PHYS BLOOD BANK SERV XMATCH: CPT

## 2024-05-15 LAB
ABO + RH PNL BLD: NORMAL
AR GENE MUT ANL BLD/T: NORMAL
BASOPHILS # BLD AUTO: 0.03 K/UL
BASOPHILS NFR BLD AUTO: 0.3 %
BLD GP AB SCN SERPL QL: NORMAL
CFTR MUT TESTED BLD/T: NEGATIVE
EOSINOPHIL # BLD AUTO: 0.14 K/UL
EOSINOPHIL NFR BLD AUTO: 1.2 %
ESTIMATED AVERAGE GLUCOSE: 103 MG/DL
FMR1 GENE MUT ANL BLD/T: NORMAL
HBA1C MFR BLD HPLC: 5.2 %
HBV SURFACE AG SERPL QL IA: NONREACTIVE
HCT VFR BLD CALC: 43.4 %
HCV AB SER QL: NONREACTIVE
HCV S/CO RATIO: 0.21 S/CO
HGB A MFR BLD: 97.4 %
HGB A2 MFR BLD: 2.6 %
HGB BLD-MCNC: 13.9 G/DL
HGB FRACT BLD-IMP: NORMAL
HIV1+2 AB SPEC QL IA.RAPID: NONREACTIVE
IMM GRANULOCYTES NFR BLD AUTO: 0.5 %
LEAD BLD-MCNC: <1 UG/DL
LYMPHOCYTES # BLD AUTO: 1.37 K/UL
LYMPHOCYTES NFR BLD AUTO: 12.2 %
MAN DIFF?: NORMAL
MCHC RBC-ENTMCNC: 27.6 PG
MCHC RBC-ENTMCNC: 32 G/DL
MCV RBC AUTO: 86.3 FL
MEV IGG FLD QL IA: 86.6 AU/ML
MEV IGG+IGM SER-IMP: POSITIVE
MONOCYTES # BLD AUTO: 0.89 K/UL
MONOCYTES NFR BLD AUTO: 7.9 %
NEUTROPHILS # BLD AUTO: 8.76 K/UL
NEUTROPHILS NFR BLD AUTO: 77.9 %
PLATELET # BLD AUTO: 297 K/UL
PMV BLD AUTO: 0 /100 WBCS
RBC # BLD: 5.03 M/UL
RBC # FLD: 14.2 %
RUBV IGG FLD-ACNC: 20.9 INDEX
RUBV IGG SER-IMP: POSITIVE
T PALLIDUM AB SER QL IA: NEGATIVE
VZV AB TITR SER: POSITIVE
VZV IGG SER IF-ACNC: 1813 INDEX
WBC # FLD AUTO: 11.25 K/UL

## 2024-05-16 ENCOUNTER — APPOINTMENT (OUTPATIENT)
Dept: OBGYN | Facility: CLINIC | Age: 34
End: 2024-05-16
Payer: COMMERCIAL

## 2024-05-16 VITALS
BODY MASS INDEX: 26.7 KG/M2 | SYSTOLIC BLOOD PRESSURE: 105 MMHG | DIASTOLIC BLOOD PRESSURE: 72 MMHG | HEIGHT: 60 IN | WEIGHT: 136 LBS

## 2024-05-16 DIAGNOSIS — Z34.81 ENCOUNTER FOR SUPERVISION OF OTHER NORMAL PREGNANCY, FIRST TRIMESTER: ICD-10-CM

## 2024-05-16 PROCEDURE — 0502F SUBSEQUENT PRENATAL CARE: CPT

## 2024-05-16 RX ORDER — ASPIRIN ENTERIC COATED TABLETS 81 MG 81 MG/1
81 TABLET, DELAYED RELEASE ORAL DAILY
Qty: 30 | Refills: 6 | Status: ACTIVE | COMMUNITY
Start: 2024-05-16 | End: 1900-01-01

## 2024-05-22 LAB
BILIRUB UR QL STRIP: NORMAL
GLUCOSE UR-MCNC: NORMAL
HCG UR QL: 0.2 EU/DL
HGB UR QL STRIP.AUTO: NORMAL
KETONES UR-MCNC: NORMAL
LEUKOCYTE ESTERASE UR QL STRIP: NORMAL
NITRITE UR QL STRIP: NORMAL
PH UR STRIP: 6.5
PROT UR STRIP-MCNC: NORMAL
SP GR UR STRIP: 1.02

## 2024-05-30 ENCOUNTER — APPOINTMENT (OUTPATIENT)
Dept: MATERNAL FETAL MEDICINE | Facility: CLINIC | Age: 34
End: 2024-05-30
Payer: COMMERCIAL

## 2024-05-30 ENCOUNTER — ASOB RESULT (OUTPATIENT)
Age: 34
End: 2024-05-30

## 2024-05-30 ENCOUNTER — APPOINTMENT (OUTPATIENT)
Dept: OBGYN | Facility: CLINIC | Age: 34
End: 2024-05-30
Payer: COMMERCIAL

## 2024-05-30 ENCOUNTER — APPOINTMENT (OUTPATIENT)
Dept: ANTEPARTUM | Facility: CLINIC | Age: 34
End: 2024-05-30
Payer: COMMERCIAL

## 2024-05-30 VITALS
HEIGHT: 60 IN | BODY MASS INDEX: 26.7 KG/M2 | HEART RATE: 78 BPM | WEIGHT: 136 LBS | DIASTOLIC BLOOD PRESSURE: 78 MMHG | SYSTOLIC BLOOD PRESSURE: 116 MMHG

## 2024-05-30 VITALS
WEIGHT: 136 LBS | DIASTOLIC BLOOD PRESSURE: 75 MMHG | HEIGHT: 60 IN | SYSTOLIC BLOOD PRESSURE: 104 MMHG | BODY MASS INDEX: 26.7 KG/M2

## 2024-05-30 DIAGNOSIS — O09.819 SUPERVISION OF PREGNANCY RESULTING FROM ASSISTED REPRODUCTIVE TECHNOLOGY, UNSPECIFIED TRIMESTER: ICD-10-CM

## 2024-05-30 PROCEDURE — 0502F SUBSEQUENT PRENATAL CARE: CPT

## 2024-05-30 PROCEDURE — 99204 OFFICE O/P NEW MOD 45 MIN: CPT | Mod: 25

## 2024-05-30 PROCEDURE — 76813 OB US NUCHAL MEAS 1 GEST: CPT

## 2024-05-30 NOTE — HISTORY OF PRESENT ILLNESS
[FreeTextEntry1] : 24 MFM Att'g Initial Consult Note: Ms Melvin is referred by Dr White.  [id number] at 13w4d (mary  by IVF-ETd5) presents with stable hypothyroidism for first trimester anatomy and Nuchal Transluscency measurement.  PMHx: Hashimoto's Dz ->Hypothyroidism since High School.    No DM, ChrHtn, of Asthma Hx.  Surg: Labiaplasty.  No complications. Meds: Unithroid (Levothyr) 75mcg. Dose has been stable for years.  Allergies: NKDA FHx:  Noncontrib.  SocHx: Lives on [place] with . No substance use.   FHx: [place] descent.   [place] descent.  Vaccinations: Per Dr White.  Influenza and CoVID vaccines are strongly recommended in pregnancy.  Family Planning: Per Dr White  Px: Very pleasant woman in NAD. Moves easily.  Excellent historian.  VS: 104/75  Hr  78   136# 5'0".  BMI 26.  HEENT: NC/AT Abd: Soft, NT.  Extr: No CCE  LAB:  Aneg/Antib POS (recent RhIg for VB);  HbEP AA 5/15:  11k>14/43%<297k mcv 86   RPR/HIV/HBSAg/HCVAb   } all neg       NIPS pending.   MFMUS: : SIUP at 13w4d by LMP. Biometry within 3d of menstrual dating. 1st Tri Jessi nl.  NT 1.6mm, nl.   Impression/Recommendation:    [id number] at 13w4d doing well.  Normal NT.  1.  Hypothyroidism<Hashimoto's in HS.  Has been stable on Unithroid (Levothyr) 75mcg.  -->Recent TFTs requested.  -->Weekly fetal testing at 34w.  2.  IVF-ET pregnancy: -->Early anatomy scheduled.  -->Comprehensive anatomy at 20w. -->Fetal ECHO at 20-21w.  Not yet ordered.   RT for MFM f/u at early antomy.  MD Brandy, FACOG

## 2024-06-03 PROBLEM — O09.819 PREGNANCY RESULTING FROM ASSISTED REPRODUCTIVE TECHNOLOGY, ANTEPARTUM: Status: ACTIVE | Noted: 2024-06-03

## 2024-06-20 ENCOUNTER — APPOINTMENT (OUTPATIENT)
Dept: OBGYN | Facility: CLINIC | Age: 34
End: 2024-06-20
Payer: COMMERCIAL

## 2024-06-20 VITALS
BODY MASS INDEX: 27.09 KG/M2 | HEIGHT: 60 IN | WEIGHT: 138 LBS | SYSTOLIC BLOOD PRESSURE: 114 MMHG | DIASTOLIC BLOOD PRESSURE: 82 MMHG

## 2024-06-20 DIAGNOSIS — Z34.92 ENCOUNTER FOR SUPERVISION OF NORMAL PREGNANCY, UNSPECIFIED, SECOND TRIMESTER: ICD-10-CM

## 2024-06-20 PROCEDURE — 0502F SUBSEQUENT PRENATAL CARE: CPT

## 2024-07-18 ENCOUNTER — APPOINTMENT (OUTPATIENT)
Dept: OBGYN | Facility: CLINIC | Age: 34
End: 2024-07-18
Payer: COMMERCIAL

## 2024-07-18 VITALS
SYSTOLIC BLOOD PRESSURE: 113 MMHG | HEIGHT: 60 IN | DIASTOLIC BLOOD PRESSURE: 70 MMHG | BODY MASS INDEX: 27.68 KG/M2 | WEIGHT: 141 LBS

## 2024-07-18 PROCEDURE — 0502F SUBSEQUENT PRENATAL CARE: CPT

## 2024-07-22 ENCOUNTER — APPOINTMENT (OUTPATIENT)
Dept: PEDIATRIC CARDIOLOGY | Facility: CLINIC | Age: 34
End: 2024-07-22

## 2024-07-22 PROCEDURE — 76827 ECHO EXAM OF FETAL HEART: CPT

## 2024-07-22 PROCEDURE — 76825 ECHO EXAM OF FETAL HEART: CPT

## 2024-07-22 PROCEDURE — 99205 OFFICE O/P NEW HI 60 MIN: CPT | Mod: 25

## 2024-07-22 PROCEDURE — 93325 DOPPLER ECHO COLOR FLOW MAPG: CPT

## 2024-07-22 NOTE — DISCUSSION/SUMMARY
[FreeTextEntry1] : Normal fetal echocardiogram Reassurance Recommend routine prenatal care and delivery  Please do not hesitate to contact me if you have any questions.   Florentin Thornton MD, MS, FAAP, FACC Attending Physician, Pediatric Cardiology [facility] Physician [address], [address] [place] [address] Office: [phone] Fax: [phone] Email: [email or web address]     I have spent 60 minutes of time on the encounter excluding separately reported services.

## 2024-07-22 NOTE — HISTORY OF PRESENT ILLNESS
[FreeTextEntry1] : Dear Dr. Bruce:  I had the pleasure of seeing your patient, CLAY MUÑOZ, in my office today, 07/22/2024. She was referred to pediatric cardiology for fetal echocardiogram.    GA 21w2d IVF pregnancy CLAY has been doing well from a clinical standpoint. There is no family history of congenital heart disease.

## 2024-07-23 ENCOUNTER — APPOINTMENT (OUTPATIENT)
Dept: ANTEPARTUM | Facility: CLINIC | Age: 34
End: 2024-07-23

## 2024-07-23 ENCOUNTER — ASOB RESULT (OUTPATIENT)
Age: 34
End: 2024-07-23

## 2024-07-23 VITALS
DIASTOLIC BLOOD PRESSURE: 76 MMHG | WEIGHT: 141 LBS | BODY MASS INDEX: 27.68 KG/M2 | HEIGHT: 60 IN | HEART RATE: 84 BPM | SYSTOLIC BLOOD PRESSURE: 116 MMHG

## 2024-07-23 PROCEDURE — 76817 TRANSVAGINAL US OBSTETRIC: CPT

## 2024-07-23 PROCEDURE — 76811 OB US DETAILED SNGL FETUS: CPT | Mod: 59

## 2024-07-24 LAB
BILIRUB UR QL STRIP: NORMAL
GLUCOSE UR-MCNC: NORMAL
HCG UR QL: 0.2 EU/DL
HGB UR QL STRIP.AUTO: NORMAL
KETONES UR-MCNC: NORMAL
LEUKOCYTE ESTERASE UR QL STRIP: NORMAL
NITRITE UR QL STRIP: NORMAL
PH UR STRIP: 6
PROT UR STRIP-MCNC: NORMAL
SP GR UR STRIP: 1.02

## 2024-08-15 ENCOUNTER — APPOINTMENT (OUTPATIENT)
Dept: OBGYN | Facility: CLINIC | Age: 34
End: 2024-08-15

## 2024-08-15 VITALS
WEIGHT: 145 LBS | HEIGHT: 60 IN | BODY MASS INDEX: 28.47 KG/M2 | DIASTOLIC BLOOD PRESSURE: 72 MMHG | SYSTOLIC BLOOD PRESSURE: 116 MMHG

## 2024-08-15 PROCEDURE — 0502F SUBSEQUENT PRENATAL CARE: CPT

## 2024-08-16 ENCOUNTER — NON-APPOINTMENT (OUTPATIENT)
Age: 34
End: 2024-08-16

## 2024-08-17 LAB
BASOPHILS # BLD AUTO: 0.03 K/UL
BASOPHILS NFR BLD AUTO: 0.2 %
EOSINOPHIL # BLD AUTO: 0.18 K/UL
EOSINOPHIL NFR BLD AUTO: 1.3 %
HCT VFR BLD CALC: 34.2 %
HGB BLD-MCNC: 10.9 G/DL
IMM GRANULOCYTES NFR BLD AUTO: 2.1 %
LYMPHOCYTES # BLD AUTO: 1.03 K/UL
LYMPHOCYTES NFR BLD AUTO: 7.7 %
MAN DIFF?: NORMAL
MCHC RBC-ENTMCNC: 27 PG
MCHC RBC-ENTMCNC: 31.9 G/DL
MCV RBC AUTO: 84.7 FL
MONOCYTES # BLD AUTO: 0.48 K/UL
MONOCYTES NFR BLD AUTO: 3.6 %
NEUTROPHILS # BLD AUTO: 11.42 K/UL
NEUTROPHILS NFR BLD AUTO: 85.1 %
PLATELET # BLD AUTO: 301 K/UL
PMV BLD AUTO: 0 /100 WBCS
RBC # BLD: 4.04 M/UL
RBC # FLD: 14 %
WBC # FLD AUTO: 13.42 K/UL

## 2024-08-21 LAB
BILIRUB UR QL STRIP: NORMAL
GLUCOSE 1H P 50 G GLC PO SERPL-MCNC: 127 MG/DL
GLUCOSE UR-MCNC: NORMAL
HCG UR QL: 0.2 EU/DL
HGB UR QL STRIP.AUTO: NORMAL
KETONES UR-MCNC: NORMAL
LEUKOCYTE ESTERASE UR QL STRIP: NORMAL
NITRITE UR QL STRIP: NORMAL
PH UR STRIP: 6.5
PROT UR STRIP-MCNC: NORMAL
SP GR UR STRIP: 1.02

## 2024-09-12 ENCOUNTER — APPOINTMENT (OUTPATIENT)
Dept: OBGYN | Facility: CLINIC | Age: 34
End: 2024-09-12
Payer: COMMERCIAL

## 2024-09-12 VITALS
WEIGHT: 145 LBS | SYSTOLIC BLOOD PRESSURE: 115 MMHG | HEIGHT: 60 IN | BODY MASS INDEX: 28.47 KG/M2 | DIASTOLIC BLOOD PRESSURE: 70 MMHG

## 2024-09-12 DIAGNOSIS — Z23 ENCOUNTER FOR IMMUNIZATION: ICD-10-CM

## 2024-09-12 PROCEDURE — 0502F SUBSEQUENT PRENATAL CARE: CPT

## 2024-09-12 PROCEDURE — 96372 THER/PROPH/DIAG INJ SC/IM: CPT

## 2024-09-18 LAB
BILIRUB UR QL STRIP: NORMAL
GLUCOSE UR-MCNC: NORMAL
HCG UR QL: 0.2 EU/DL
HGB UR QL STRIP.AUTO: NORMAL
KETONES UR-MCNC: NORMAL
LEUKOCYTE ESTERASE UR QL STRIP: NORMAL
NITRITE UR QL STRIP: NORMAL
PH UR STRIP: 5.5
PROT UR STRIP-MCNC: NORMAL
SP GR UR STRIP: 1.03

## 2024-09-26 ENCOUNTER — APPOINTMENT (OUTPATIENT)
Dept: OBGYN | Facility: CLINIC | Age: 34
End: 2024-09-26
Payer: COMMERCIAL

## 2024-09-26 VITALS
BODY MASS INDEX: 28.86 KG/M2 | SYSTOLIC BLOOD PRESSURE: 112 MMHG | WEIGHT: 147 LBS | DIASTOLIC BLOOD PRESSURE: 76 MMHG | HEIGHT: 60 IN

## 2024-09-26 PROCEDURE — 0502F SUBSEQUENT PRENATAL CARE: CPT

## 2024-09-30 LAB
BILIRUB UR QL STRIP: NORMAL
GLUCOSE UR-MCNC: NORMAL
HCG UR QL: 0.2 EU/DL
HGB UR QL STRIP.AUTO: NORMAL
KETONES UR-MCNC: NORMAL
LEUKOCYTE ESTERASE UR QL STRIP: NORMAL
NITRITE UR QL STRIP: NORMAL
PH UR STRIP: 7
PROT UR STRIP-MCNC: NORMAL
SP GR UR STRIP: 1.02

## 2024-10-09 ENCOUNTER — NON-APPOINTMENT (OUTPATIENT)
Age: 34
End: 2024-10-09

## 2024-10-10 ENCOUNTER — LABORATORY RESULT (OUTPATIENT)
Age: 34
End: 2024-10-10

## 2024-10-10 ENCOUNTER — APPOINTMENT (OUTPATIENT)
Dept: OBGYN | Facility: CLINIC | Age: 34
End: 2024-10-10
Payer: COMMERCIAL

## 2024-10-10 VITALS
HEIGHT: 60 IN | BODY MASS INDEX: 29.45 KG/M2 | SYSTOLIC BLOOD PRESSURE: 102 MMHG | DIASTOLIC BLOOD PRESSURE: 66 MMHG | WEIGHT: 150 LBS

## 2024-10-10 DIAGNOSIS — Z34.93 ENCOUNTER FOR SUPERVISION OF NORMAL PREGNANCY, UNSPECIFIED, THIRD TRIMESTER: ICD-10-CM

## 2024-10-10 PROCEDURE — 0502F SUBSEQUENT PRENATAL CARE: CPT

## 2024-10-11 ENCOUNTER — APPOINTMENT (OUTPATIENT)
Dept: ANTEPARTUM | Facility: CLINIC | Age: 34
End: 2024-10-11
Payer: COMMERCIAL

## 2024-10-11 ENCOUNTER — ASOB RESULT (OUTPATIENT)
Age: 34
End: 2024-10-11

## 2024-10-11 VITALS
WEIGHT: 149 LBS | HEART RATE: 85 BPM | DIASTOLIC BLOOD PRESSURE: 82 MMHG | SYSTOLIC BLOOD PRESSURE: 114 MMHG | BODY MASS INDEX: 29.25 KG/M2 | HEIGHT: 60 IN

## 2024-10-11 PROCEDURE — 76816 OB US FOLLOW-UP PER FETUS: CPT

## 2024-10-11 PROCEDURE — 76819 FETAL BIOPHYS PROFIL W/O NST: CPT | Mod: 59

## 2024-10-13 LAB
BASOPHILS # BLD AUTO: 0 K/UL
BASOPHILS NFR BLD AUTO: 0 %
BILIRUB UR QL STRIP: NORMAL
EOSINOPHIL # BLD AUTO: 0 K/UL
EOSINOPHIL NFR BLD AUTO: 0 %
GLUCOSE UR-MCNC: NORMAL
HCG UR QL: 0.2 EU/DL
HCT VFR BLD CALC: 33.7 %
HGB BLD-MCNC: 10.5 G/DL
HGB UR QL STRIP.AUTO: NORMAL
HIV1+2 AB SPEC QL IA.RAPID: NONREACTIVE
KETONES UR-MCNC: NORMAL
LEUKOCYTE ESTERASE UR QL STRIP: NORMAL
LYMPHOCYTES # BLD AUTO: 1.87 K/UL
LYMPHOCYTES NFR BLD AUTO: 12 %
MAN DIFF?: NORMAL
MCHC RBC-ENTMCNC: 25.4 PG
MCHC RBC-ENTMCNC: 31.2 G/DL
MCV RBC AUTO: 81.6 FL
MONOCYTES # BLD AUTO: 1.25 K/UL
MONOCYTES NFR BLD AUTO: 8 %
NEUTROPHILS # BLD AUTO: 12.32 K/UL
NEUTROPHILS NFR BLD AUTO: 76 %
NITRITE UR QL STRIP: NORMAL
PH UR STRIP: 6
PLATELET # BLD AUTO: 315 K/UL
PROT UR STRIP-MCNC: NORMAL
RBC # BLD: 4.13 M/UL
RBC # FLD: 14 %
SP GR UR STRIP: 1.01
T PALLIDUM AB SER QL IA: NEGATIVE
WBC # FLD AUTO: 15.59 K/UL

## 2024-10-24 ENCOUNTER — APPOINTMENT (OUTPATIENT)
Dept: OBGYN | Facility: CLINIC | Age: 34
End: 2024-10-24
Payer: COMMERCIAL

## 2024-10-24 VITALS
BODY MASS INDEX: 30.04 KG/M2 | SYSTOLIC BLOOD PRESSURE: 104 MMHG | WEIGHT: 153 LBS | HEIGHT: 60 IN | DIASTOLIC BLOOD PRESSURE: 69 MMHG

## 2024-10-24 DIAGNOSIS — Z34.93 ENCOUNTER FOR SUPERVISION OF NORMAL PREGNANCY, UNSPECIFIED, THIRD TRIMESTER: ICD-10-CM

## 2024-10-24 PROCEDURE — 0502F SUBSEQUENT PRENATAL CARE: CPT

## 2024-10-24 RX ORDER — CHLORHEXIDINE GLUCONATE 4 %
325 (65 FE) LIQUID (ML) TOPICAL DAILY
Qty: 30 | Refills: 4 | Status: ACTIVE | COMMUNITY
Start: 2024-10-24 | End: 1900-01-01

## 2024-10-24 RX ORDER — VALACYCLOVIR 500 MG/1
500 TABLET, FILM COATED ORAL
Qty: 60 | Refills: 1 | Status: ACTIVE | COMMUNITY
Start: 2024-10-24 | End: 1900-01-01

## 2024-10-29 LAB
BILIRUB UR QL STRIP: NORMAL
GLUCOSE UR-MCNC: NORMAL
HCG UR QL: 0.2
HGB UR QL STRIP.AUTO: NORMAL
KETONES UR-MCNC: NORMAL
LEUKOCYTE ESTERASE UR QL STRIP: NORMAL
NITRITE UR QL STRIP: NORMAL
PH UR STRIP: 6
PROT UR STRIP-MCNC: NORMAL
SP GR UR STRIP: 1.02

## 2024-11-07 ENCOUNTER — APPOINTMENT (OUTPATIENT)
Dept: OBGYN | Facility: CLINIC | Age: 34
End: 2024-11-07
Payer: COMMERCIAL

## 2024-11-07 VITALS
BODY MASS INDEX: 29.84 KG/M2 | HEIGHT: 60 IN | DIASTOLIC BLOOD PRESSURE: 80 MMHG | WEIGHT: 152 LBS | SYSTOLIC BLOOD PRESSURE: 117 MMHG

## 2024-11-07 DIAGNOSIS — Z34.93 ENCOUNTER FOR SUPERVISION OF NORMAL PREGNANCY, UNSPECIFIED, THIRD TRIMESTER: ICD-10-CM

## 2024-11-07 PROCEDURE — 0502F SUBSEQUENT PRENATAL CARE: CPT

## 2024-11-08 ENCOUNTER — ASOB RESULT (OUTPATIENT)
Age: 34
End: 2024-11-08

## 2024-11-08 ENCOUNTER — APPOINTMENT (OUTPATIENT)
Dept: ANTEPARTUM | Facility: CLINIC | Age: 34
End: 2024-11-08
Payer: COMMERCIAL

## 2024-11-08 VITALS
BODY MASS INDEX: 30.04 KG/M2 | HEART RATE: 88 BPM | HEIGHT: 60 IN | DIASTOLIC BLOOD PRESSURE: 77 MMHG | SYSTOLIC BLOOD PRESSURE: 112 MMHG | WEIGHT: 153 LBS

## 2024-11-08 PROCEDURE — 76819 FETAL BIOPHYS PROFIL W/O NST: CPT | Mod: 59

## 2024-11-08 PROCEDURE — 76816 OB US FOLLOW-UP PER FETUS: CPT

## 2024-11-11 LAB
C TRACH RRNA SPEC QL NAA+PROBE: NOT DETECTED
GP B STREP DNA SPEC QL NAA+PROBE: NOT DETECTED
N GONORRHOEA RRNA SPEC QL NAA+PROBE: NOT DETECTED
SOURCE AMPLIFICATION: NORMAL
SOURCE GBS: NORMAL
T VAGINALIS RRNA SPEC QL NAA+PROBE: NOT DETECTED

## 2024-11-14 ENCOUNTER — APPOINTMENT (OUTPATIENT)
Dept: OBGYN | Facility: CLINIC | Age: 34
End: 2024-11-14
Payer: COMMERCIAL

## 2024-11-14 VITALS
DIASTOLIC BLOOD PRESSURE: 74 MMHG | BODY MASS INDEX: 30.23 KG/M2 | SYSTOLIC BLOOD PRESSURE: 105 MMHG | WEIGHT: 154 LBS | HEIGHT: 60 IN

## 2024-11-14 LAB
BILIRUB UR QL STRIP: NORMAL
GLUCOSE UR-MCNC: NORMAL
HCG UR QL: 0.2 EU/DL
HGB UR QL STRIP.AUTO: NORMAL
KETONES UR-MCNC: NORMAL
LEUKOCYTE ESTERASE UR QL STRIP: NORMAL
NITRITE UR QL STRIP: NORMAL
PH UR STRIP: 6
PROT UR STRIP-MCNC: NORMAL
SP GR UR STRIP: 1.01

## 2024-11-14 PROCEDURE — 0502F SUBSEQUENT PRENATAL CARE: CPT

## 2024-11-15 ENCOUNTER — ASOB RESULT (OUTPATIENT)
Age: 34
End: 2024-11-15

## 2024-11-15 ENCOUNTER — APPOINTMENT (OUTPATIENT)
Dept: ANTEPARTUM | Facility: CLINIC | Age: 34
End: 2024-11-15
Payer: COMMERCIAL

## 2024-11-15 VITALS
BODY MASS INDEX: 29.45 KG/M2 | WEIGHT: 156 LBS | HEIGHT: 61 IN | DIASTOLIC BLOOD PRESSURE: 75 MMHG | SYSTOLIC BLOOD PRESSURE: 112 MMHG | HEART RATE: 80 BPM

## 2024-11-15 PROCEDURE — 76819 FETAL BIOPHYS PROFIL W/O NST: CPT

## 2024-11-21 ENCOUNTER — NON-APPOINTMENT (OUTPATIENT)
Age: 34
End: 2024-11-21

## 2024-11-21 ENCOUNTER — APPOINTMENT (OUTPATIENT)
Dept: OBGYN | Facility: CLINIC | Age: 34
End: 2024-11-21
Payer: COMMERCIAL

## 2024-11-21 VITALS
HEIGHT: 61 IN | SYSTOLIC BLOOD PRESSURE: 111 MMHG | BODY MASS INDEX: 28.89 KG/M2 | WEIGHT: 153 LBS | DIASTOLIC BLOOD PRESSURE: 69 MMHG

## 2024-11-21 PROCEDURE — 0502F SUBSEQUENT PRENATAL CARE: CPT

## 2024-11-22 ENCOUNTER — ASOB RESULT (OUTPATIENT)
Age: 34
End: 2024-11-22

## 2024-11-22 ENCOUNTER — NON-APPOINTMENT (OUTPATIENT)
Age: 34
End: 2024-11-22

## 2024-11-22 ENCOUNTER — APPOINTMENT (OUTPATIENT)
Dept: ANTEPARTUM | Facility: CLINIC | Age: 34
End: 2024-11-22
Payer: COMMERCIAL

## 2024-11-22 VITALS
HEIGHT: 61 IN | DIASTOLIC BLOOD PRESSURE: 76 MMHG | WEIGHT: 156 LBS | SYSTOLIC BLOOD PRESSURE: 103 MMHG | HEART RATE: 74 BPM | BODY MASS INDEX: 29.45 KG/M2

## 2024-11-22 PROCEDURE — 76816 OB US FOLLOW-UP PER FETUS: CPT

## 2024-11-22 PROCEDURE — 76819 FETAL BIOPHYS PROFIL W/O NST: CPT | Mod: 59

## 2024-11-25 ENCOUNTER — NON-APPOINTMENT (OUTPATIENT)
Age: 34
End: 2024-11-25

## 2024-11-25 LAB
BILIRUB UR QL STRIP: NORMAL
GLUCOSE UR-MCNC: NORMAL
HCG UR QL: 0.2 EU/DL
HGB UR QL STRIP.AUTO: NORMAL
KETONES UR-MCNC: 40
LEUKOCYTE ESTERASE UR QL STRIP: NORMAL
NITRITE UR QL STRIP: NORMAL
PH UR STRIP: 6
PROT UR STRIP-MCNC: 30
SP GR UR STRIP: 1.02

## 2024-11-26 ENCOUNTER — NON-APPOINTMENT (OUTPATIENT)
Age: 34
End: 2024-11-26

## 2024-11-26 ENCOUNTER — INPATIENT (INPATIENT)
Facility: HOSPITAL | Age: 34
LOS: 2 days | Discharge: HOME | DRG: 833 | End: 2024-11-29
Attending: OBSTETRICS & GYNECOLOGY | Admitting: OBSTETRICS & GYNECOLOGY
Payer: COMMERCIAL

## 2024-11-26 VITALS — SYSTOLIC BLOOD PRESSURE: 129 MMHG | DIASTOLIC BLOOD PRESSURE: 71 MMHG | HEART RATE: 88 BPM

## 2024-11-26 DIAGNOSIS — O61.0 FAILED MEDICAL INDUCTION OF LABOR: ICD-10-CM

## 2024-11-26 LAB
APPEARANCE UR: ABNORMAL
BASOPHILS # BLD AUTO: 0.02 K/UL — SIGNIFICANT CHANGE UP (ref 0–0.2)
BASOPHILS NFR BLD AUTO: 0.2 % — SIGNIFICANT CHANGE UP (ref 0–1)
BILIRUB UR-MCNC: NEGATIVE — SIGNIFICANT CHANGE UP
COLOR SPEC: YELLOW — SIGNIFICANT CHANGE UP
DIFF PNL FLD: NEGATIVE — SIGNIFICANT CHANGE UP
EOSINOPHIL # BLD AUTO: 0.1 K/UL — SIGNIFICANT CHANGE UP (ref 0–0.7)
EOSINOPHIL NFR BLD AUTO: 0.9 % — SIGNIFICANT CHANGE UP (ref 0–8)
GLUCOSE UR QL: NEGATIVE MG/DL — SIGNIFICANT CHANGE UP
HCT VFR BLD CALC: 34.9 % — LOW (ref 37–47)
HGB BLD-MCNC: 11.1 G/DL — LOW (ref 12–16)
IMM GRANULOCYTES NFR BLD AUTO: 0.8 % — HIGH (ref 0.1–0.3)
KETONES UR-MCNC: ABNORMAL MG/DL
LEUKOCYTE ESTERASE UR-ACNC: NEGATIVE — SIGNIFICANT CHANGE UP
LYMPHOCYTES # BLD AUTO: 1.13 K/UL — LOW (ref 1.2–3.4)
LYMPHOCYTES # BLD AUTO: 9.8 % — LOW (ref 20.5–51.1)
MCHC RBC-ENTMCNC: 24.3 PG — LOW (ref 27–31)
MCHC RBC-ENTMCNC: 31.8 G/DL — LOW (ref 32–37)
MCV RBC AUTO: 76.4 FL — LOW (ref 81–99)
MONOCYTES # BLD AUTO: 0.79 K/UL — HIGH (ref 0.1–0.6)
MONOCYTES NFR BLD AUTO: 6.8 % — SIGNIFICANT CHANGE UP (ref 1.7–9.3)
NEUTROPHILS # BLD AUTO: 9.45 K/UL — HIGH (ref 1.4–6.5)
NEUTROPHILS NFR BLD AUTO: 81.5 % — HIGH (ref 42.2–75.2)
NITRITE UR-MCNC: NEGATIVE — SIGNIFICANT CHANGE UP
NRBC # BLD: 0 /100 WBCS — SIGNIFICANT CHANGE UP (ref 0–0)
PH UR: 5.5 — SIGNIFICANT CHANGE UP (ref 5–8)
PLATELET # BLD AUTO: 319 K/UL — SIGNIFICANT CHANGE UP (ref 130–400)
PMV BLD: 10.3 FL — SIGNIFICANT CHANGE UP (ref 7.4–10.4)
PRENATAL SYPHILIS TEST: SIGNIFICANT CHANGE UP
PROT UR-MCNC: SIGNIFICANT CHANGE UP MG/DL
RBC # BLD: 4.57 M/UL — SIGNIFICANT CHANGE UP (ref 4.2–5.4)
RBC # FLD: 15.3 % — HIGH (ref 11.5–14.5)
SP GR SPEC: 1.02 — SIGNIFICANT CHANGE UP (ref 1–1.03)
UROBILINOGEN FLD QL: 0.2 MG/DL — SIGNIFICANT CHANGE UP (ref 0.2–1)
WBC # BLD: 11.58 K/UL — HIGH (ref 4.8–10.8)
WBC # FLD AUTO: 11.58 K/UL — HIGH (ref 4.8–10.8)

## 2024-11-26 PROCEDURE — 86592 SYPHILIS TEST NON-TREP QUAL: CPT

## 2024-11-26 PROCEDURE — 86901 BLOOD TYPING SEROLOGIC RH(D): CPT

## 2024-11-26 PROCEDURE — 59050 FETAL MONITOR W/REPORT: CPT

## 2024-11-26 PROCEDURE — 36415 COLL VENOUS BLD VENIPUNCTURE: CPT

## 2024-11-26 PROCEDURE — 86870 RBC ANTIBODY IDENTIFICATION: CPT

## 2024-11-26 PROCEDURE — 86850 RBC ANTIBODY SCREEN: CPT

## 2024-11-26 PROCEDURE — 90384 RH IG FULL-DOSE IM: CPT

## 2024-11-26 PROCEDURE — 86077 PHYS BLOOD BANK SERV XMATCH: CPT

## 2024-11-26 PROCEDURE — 85025 COMPLETE CBC W/AUTO DIFF WBC: CPT

## 2024-11-26 PROCEDURE — 86880 COOMBS TEST DIRECT: CPT

## 2024-11-26 PROCEDURE — 85461 HEMOGLOBIN FETAL: CPT

## 2024-11-26 PROCEDURE — 86900 BLOOD TYPING SEROLOGIC ABO: CPT

## 2024-11-26 PROCEDURE — 86922 COMPATIBILITY TEST ANTIGLOB: CPT

## 2024-11-26 PROCEDURE — 81001 URINALYSIS AUTO W/SCOPE: CPT

## 2024-11-26 RX ORDER — INFLUENZA VIRUS VACCINE 15; 15; 15; 15 UG/.5ML; UG/.5ML; UG/.5ML; UG/.5ML
0.5 SUSPENSION INTRAMUSCULAR ONCE
Refills: 0 | Status: COMPLETED | OUTPATIENT
Start: 2024-11-26 | End: 2024-11-26

## 2024-11-26 RX ORDER — 0.9 % SODIUM CHLORIDE 0.9 %
1000 INTRAVENOUS SOLUTION INTRAVENOUS
Refills: 0 | Status: DISCONTINUED | OUTPATIENT
Start: 2024-11-26 | End: 2024-11-27

## 2024-11-26 RX ADMIN — Medication 125 MILLILITER(S): at 17:51

## 2024-11-26 NOTE — OB PROVIDER H&P - ASSESSMENT
35y/o  at 39w3d, GBS neg, pregnancy complicated by Rh neg status (s/p rhogam ), hypothyroidism (on 75mcg unithroid daily), IVF pregnancy presenting to L&D for IOL at term    #Labor  -admission labs  -monitor vitals  -monitor EFM/toco  -IVF  -clear liquid diet  -pain control PRN, consider epidural  -x2u  -75mcg unithroid daily ordered  -cytotec to start induction 33y/o  at 39w3d, GBS neg, pregnancy complicated by Rh neg status (s/p rhogam ), hypothyroidism (on 75mcg unithroid daily), IVF pregnancy presenting to L&D for IOL at term    #Labor  -admission labs  -monitor vitals  -monitor EFM/toco  -IVF  -clear liquid diet  -pain control PRN, consider epidural  -x2u  -75mcg Unithroid daily ordered (same as prepreg)   - Cytotec to start induction

## 2024-11-26 NOTE — OB PROVIDER H&P - ATTENDING COMMENTS
at 39+3 for elective induction  gbs (-), rh (-), hypothyroid on unithroid, ivf  admit to l+d   FHR category 1  cytotec induction  pain meds prn  anticipate

## 2024-11-26 NOTE — OB PROVIDER H&P - HISTORY OF PRESENT ILLNESS
at 39w3d, JUDE 24 by 5 day embryo transfer on 3/15/24, presents to L&D for scheduled IOL at term. Pt feels well today, denies CTX, LOF, VB. Good FM. GBS neg.     Pregnancy complicated by:   #IVF pregnancy  -day 5 embryo on 3/15/24  -s/p vaginal progesterone suppositories and progesterone injections  #ASA use in preg  -started at 12w  #Rh neg  -rhogam 24  #HSV ?IgG pos  -?valcyclovir   #Hypothyroidism  -Hashimoto's Disease, been hypothyroid since High School  -Unithroid (Levothyr) 75mcg. Dose has been stable for years.    at 39w3d, JUDE 24 by 5 day embryo transfer on 3/15/24, presents to L&D for scheduled IOL at term. Pt feels well today, denies CTX, LOF, VB. Good FM. GBS neg.     Pregnancy complicated by:   #IVF pregnancy  -day 5 embryo on 3/15/24  -s/p vaginal progesterone suppositories and progesterone injections  #Rh neg  -rhogam 24  #HSV IgG pos  -no h/o oral or genital outbreaks  -spec neg today  #Hypothyroidism  -Hashimoto's Disease, been hypothyroid since High School  -Unithroid 75mcg daily. Dose has been stable for years.

## 2024-11-26 NOTE — OB PROVIDER H&P - NSHPPHYSICALEXAM_GEN_ALL_CORE
Physical exam:    Vital Signs Last 24 Hrs  T(F): --  HR: 88 (26 Nov 2024 15:47) (88 - 88)  BP: 129/71 (26 Nov 2024 15:47) (129/71 - 129/71)  RR: --  SpO2: --    Gen: AAOx3, NAD  Heart: RRR, S1 S2 WNL  Lungs: CTAB  Abdomen: Soft, nontender, no distension, gravid  Ext: No calf tenderness, no swelling    EFM: 130, mod alisha, +accel, -decel  toco: none  SVE:   BSS:  EFW by leopold: 3500 Physical exam:    Vital Signs Last 24 Hrs  T(F): --  HR: 88 (26 Nov 2024 15:47) (88 - 88)  BP: 129/71 (26 Nov 2024 15:47) (129/71 - 129/71)  RR: --  SpO2: --    Gen: AAOx3, NAD  Heart: RRR, S1 S2 WNL  Lungs: CTAB  Abdomen: Soft, nontender, no distension, gravid  Ext: No calf tenderness, no swelling    EFM: 130, mod alisha, +accel, -decel  toco: none  SVE: 0/0/-3  BSS: vertex, post plac, MVP 5.0  EFW by leopold: 3500

## 2024-11-26 NOTE — OB PROVIDER H&P - NS_SPECEXAM_OBGYN_ALL_OB
[address] [facility] [address] [phone]               Thank you for choosing us for your health care visit with Tod Bryson DO.   We are glad to serve you and happy to provide you with this summary of your vi at that time. STEP ONE SATISFIER This is a 1-hour appointment taught by a registered dietitian and diabetes educator. Half of this appointment time is dedicated to the development of a personalized education plan.   This plan is developed privately in a 1: Instructions and Information about [address]   Tests on this list are recommended by your physician but may not be covered, or covered at this frequency, by your insurer.  Please check with your insurance morris found for this or any previous visit.  Limited to patients who meet one of the following criteria:   • Men who are 73-68 years old and have smoked more than 100 cigarettes in their lifetime   • Anyone with a family history    Colorectal Cancer Screening Cov Hemophiliacs who received Factor VIII or IX concentrates   Clients of institutions for the mentally retarded   Persons who live in the same house as a HepB virus carrier   Homosexual men   Illicit injectable drug abusers     Tetanus Toxoid- Only covered wi vial;discard solution 90 days after opening  Dx Code: E11.65 IDDM           ROSIBEL MICROLET LANCETS Misc   Test 3 times daily DxCode: E11.65 IDDM           Clopidogrel Bisulfate 75 MG Tabs   Take 75 mg by mouth daily.    Commonly known as:  PLAVIX You can access your MyChart to more actively manage your health care and view more details from this visit by going to [email or web address].   If you've recently had a stay at the Hospital you can access your discharge instructions in [address] by lynne Yes

## 2024-11-26 NOTE — OB PROVIDER H&P - NSHPLABSRESULTS_GEN_ALL_CORE
Labs:  No NIPTs, s/p preimplantation genetic testing, found to be normal    LAB:   5/2/24  A-  Antib POS (recent RhIg for VB)  HbEP AA  varicella immune  rubeola immune  rubella immune  HCV NR  RPR NR  HepBsAg NR  HIV NR  A1C 5.2    8/21/24      10/10/24  RPR NR  HIV NR    11/7/24  GBS NR  GCCT Neg  ?  MFM US:  5/30: SIUP at 13w4d by LMP. Biometry within 3d of menstrual dating. 1st Tri Jessi nl. NT 1.6mm, nl.  21w2d: vertex, posterior plac, MVP 6.42, anatomy wnl  32w5d: vertex, posterior plac, MVP 4.91, 2182g, 62%tile  36w5d: vertex, posterior plac, MVP 5.02, 3114g, 65%tile  38w5d: vertex, posterior plac, MVP 5.24, 3287g, 42%tile

## 2024-11-27 PROCEDURE — 59409 OBSTETRICAL CARE: CPT

## 2024-11-27 RX ORDER — PRAMOXINE HYDROCHLORIDE 1 MG/ML
1 LOTION TOPICAL EVERY 4 HOURS
Refills: 0 | Status: DISCONTINUED | OUTPATIENT
Start: 2024-11-27 | End: 2024-11-29

## 2024-11-27 RX ORDER — FENTANYL/BUPIVACAINE/NS/PF 2-625MCG/1
250 PLASTIC BAG, INJECTION (ML) INJECTION
Refills: 0 | Status: DISCONTINUED | OUTPATIENT
Start: 2024-11-27 | End: 2024-11-29

## 2024-11-27 RX ORDER — OXYCODONE HYDROCHLORIDE 30 MG/1
5 TABLET ORAL ONCE
Refills: 0 | Status: DISCONTINUED | OUTPATIENT
Start: 2024-11-27 | End: 2024-11-29

## 2024-11-27 RX ORDER — DIPHENHYDRAMINE HCL 25 MG
25 CAPSULE ORAL EVERY 6 HOURS
Refills: 0 | Status: DISCONTINUED | OUTPATIENT
Start: 2024-11-27 | End: 2024-11-29

## 2024-11-27 RX ORDER — NALOXONE HCL 0.4 MG/ML
0.1 AMPUL (ML) INJECTION
Refills: 0 | Status: DISCONTINUED | OUTPATIENT
Start: 2024-11-27 | End: 2024-11-29

## 2024-11-27 RX ORDER — ACETAMINOPHEN 500MG 500 MG/1
975 TABLET, COATED ORAL
Refills: 0 | Status: DISCONTINUED | OUTPATIENT
Start: 2024-11-27 | End: 2024-11-29

## 2024-11-27 RX ORDER — TETANUS TOXOID, REDUCED DIPHTHERIA TOXOID AND ACELLULAR PERTUSSIS VACCINE, ADSORBED 5; 2.5; 8; 8; 2.5 [IU]/.5ML; [IU]/.5ML; UG/.5ML; UG/.5ML; UG/.5ML
0.5 SUSPENSION INTRAMUSCULAR ONCE
Refills: 0 | Status: DISCONTINUED | OUTPATIENT
Start: 2024-11-27 | End: 2024-11-29

## 2024-11-27 RX ORDER — HYDROCORTISONE BUTYRATE 0.1 %
1 CREAM (GRAM) TOPICAL EVERY 6 HOURS
Refills: 0 | Status: DISCONTINUED | OUTPATIENT
Start: 2024-11-27 | End: 2024-11-29

## 2024-11-27 RX ORDER — DEXAMETHASONE 1.5 MG/1
4 TABLET ORAL EVERY 6 HOURS
Refills: 0 | Status: DISCONTINUED | OUTPATIENT
Start: 2024-11-27 | End: 2024-11-29

## 2024-11-27 RX ORDER — BENZOCAINE 10 %
1 OINTMENT (GRAM) TOPICAL EVERY 6 HOURS
Refills: 0 | Status: DISCONTINUED | OUTPATIENT
Start: 2024-11-27 | End: 2024-11-29

## 2024-11-27 RX ORDER — SODIUM CHLORIDE 9 MG/ML
3 INJECTION, SOLUTION INTRAMUSCULAR; INTRAVENOUS; SUBCUTANEOUS EVERY 8 HOURS
Refills: 0 | Status: DISCONTINUED | OUTPATIENT
Start: 2024-11-27 | End: 2024-11-29

## 2024-11-27 RX ORDER — IBUPROFEN 200 MG
600 TABLET ORAL EVERY 6 HOURS
Refills: 0 | Status: DISCONTINUED | OUTPATIENT
Start: 2024-11-27 | End: 2024-11-29

## 2024-11-27 RX ORDER — LANOLIN 72 %
1 OINTMENT (GRAM) TOPICAL EVERY 6 HOURS
Refills: 0 | Status: DISCONTINUED | OUTPATIENT
Start: 2024-11-27 | End: 2024-11-29

## 2024-11-27 RX ORDER — OXYCODONE HYDROCHLORIDE 30 MG/1
5 TABLET ORAL
Refills: 0 | Status: DISCONTINUED | OUTPATIENT
Start: 2024-11-27 | End: 2024-11-29

## 2024-11-27 RX ORDER — DIBUCAINE 1 %
1 OINTMENT (GRAM) TOPICAL EVERY 6 HOURS
Refills: 0 | Status: DISCONTINUED | OUTPATIENT
Start: 2024-11-27 | End: 2024-11-29

## 2024-11-27 RX ORDER — .BETA.-CAROTENE, SODIUM ACETATE, ASCORBIC ACID, CHOLECALCIFEROL, .ALPHA.-TOCOPHEROL ACETATE, DL-, THIAMINE MONONITRATE, RIBOFLAVIN, NIACINAMIDE, PYRIDOXINE HYDROCHLORIDE, FOLIC ACID, CYANOCOBALAMIN, CALCIUM CARBONATE, FERROUS FUMARATE, ZINC OXIDE AND CUPRIC OXIDE 2000; 2000; 120; 400; 22; 1.84; 3; 20; 10; 1; 12; 200; 27; 25; 2 [IU]/1; [IU]/1; MG/1; [IU]/1; MG/1; MG/1; MG/1; MG/1; MG/1; MG/1; UG/1; MG/1; MG/1; MG/1; MG/1
1 TABLET ORAL DAILY
Refills: 0 | Status: DISCONTINUED | OUTPATIENT
Start: 2024-11-27 | End: 2024-11-29

## 2024-11-27 RX ORDER — WITCH HAZEL 50 G/100ML
1 SOLUTION RECTAL EVERY 4 HOURS
Refills: 0 | Status: DISCONTINUED | OUTPATIENT
Start: 2024-11-27 | End: 2024-11-29

## 2024-11-27 RX ORDER — IBUPROFEN 200 MG
600 TABLET ORAL EVERY 6 HOURS
Refills: 0 | Status: COMPLETED | OUTPATIENT
Start: 2024-11-27 | End: 2025-10-26

## 2024-11-27 RX ORDER — SIMETHICONE 125 MG
80 CAPSULE ORAL EVERY 4 HOURS
Refills: 0 | Status: DISCONTINUED | OUTPATIENT
Start: 2024-11-27 | End: 2024-11-29

## 2024-11-27 RX ORDER — KETOROLAC TROMETHAMINE 30 MG/ML
30 INJECTION INTRAMUSCULAR; INTRAVENOUS ONCE
Refills: 0 | Status: DISCONTINUED | OUTPATIENT
Start: 2024-11-27 | End: 2024-11-27

## 2024-11-27 RX ORDER — ONDANSETRON HYDROCHLORIDE 4 MG/1
4 TABLET, FILM COATED ORAL EVERY 6 HOURS
Refills: 0 | Status: DISCONTINUED | OUTPATIENT
Start: 2024-11-27 | End: 2024-11-29

## 2024-11-27 RX ADMIN — Medication 2 MILLIUNIT(S)/MIN: at 08:11

## 2024-11-27 RX ADMIN — ACETAMINOPHEN 500MG 975 MILLIGRAM(S): 500 TABLET, COATED ORAL at 21:39

## 2024-11-27 RX ADMIN — ACETAMINOPHEN 500MG 975 MILLIGRAM(S): 500 TABLET, COATED ORAL at 22:09

## 2024-11-27 RX ADMIN — SODIUM CHLORIDE 3 MILLILITER(S): 9 INJECTION, SOLUTION INTRAMUSCULAR; INTRAVENOUS; SUBCUTANEOUS at 21:29

## 2024-11-27 RX ADMIN — KETOROLAC TROMETHAMINE 30 MILLIGRAM(S): 30 INJECTION INTRAMUSCULAR; INTRAVENOUS at 19:40

## 2024-11-27 NOTE — PROCEDURE NOTE - ADDITIONAL PROCEDURE DETAILS
Hemodynamically stable post epidural placement  Analgesia at T10 R=L  FH wnl  PCEA:  Bupivacaine 0.625% + Fentanyl 2mcg/ml  PCEA 5ml/15min lockout  CEI: 10ml/hr Hemodynamically stable post epidural placement  Analgesia at T10 R=L  FH wnl  PCEA:  Bupivacaine 0.625% + Fentanyl 2mcg/ml  PCEA 5ml/15min lockout  CEI: 10ml/hr    0700 Agoliati took over     10cc .125% 1245     1300 10cc .125% Bupi and 100 ucg Fentanyl    1325 Post Top OFF Patient evaluated at bedside.  Hemodynamically stable, degree of motor block appropriate for epidural medication administered. Pain is well controlled bilaterally. Patient is aware that the anesthesia team is available 24/7, in case she needs more pain medication or has any other anesthesia-related needs or questions.     .0625% Bupivacaine +2ucg/cc Fentanyl epidural PCEA 10/5/15 Hemodynamically stable post epidural placement  Analgesia at T10 R=L  FH wnl  PCEA:  Bupivacaine 0.625% + Fentanyl 2mcg/ml  PCEA 5ml/15min lockout  CEI: 10ml/hr    0700 Agoliati took over     10cc .125% 1245     1300 10cc .125% Bupi and 100 ucg Fentanyl    1325 Post Top OFF Patient evaluated at bedside.  Hemodynamically stable, degree of motor block appropriate for epidural medication administered. Pain is well controlled bilaterally. Patient is aware that the anesthesia team is available 24/7, in case she needs more pain medication or has any other anesthesia-related needs or questions.     .0625% Bupivacaine +2ucg/cc Fentanyl epidural PCEA 10/5/15    Post Labor  Epidural/ Delivery  Evaluation Note:    Uncomplicated anesthetic for Vaginal Delivery.    Patient seen at bedside. Epidural to be removed by RN before patient transfer.  Patient moving B/L lower extremities.  Motor block appropriate and resolving. Vital Signs are stable. Pain well controlled.     Raphael Score greater than 9    Mental Status:  __x__ Awake   ___x__ Alert   _____ Drowsy   _____ Sedated    Nausea/Vomiting:  __x__ NO  ______Yes,   See Post - Op Orders          Pain Scale (0-10):  _____    Treatment: __X__ None       Plan: Discharge:   ____Home       __X___Floor     _____Critical Care    _____

## 2024-11-27 NOTE — PROGRESS NOTE ADULT - SUBJECTIVE AND OBJECTIVE BOX
PGY1 Progress Note    Patient seen and examined at bedside, no current complaints.        Vital Signs Last 24 Hrs  T(C): 36.2 (2024 10:20), Max: 36.7 (2024 16:02)  T(F): 97.16 (2024 10:20), Max: 98.06 (2024 17:21)  HR: 94 (2024 11:33) (63 - 132)  BP: 117/76 (2024 11:30) (89/47 - 133/89)  RR: 18 (2024 05:59) (18 - 20)  SpO2: 100% (2024 11:33) (94% - 100%)    Parameters below as of 2024 16:02  Patient On (Oxygen Delivery Method): room air    EFM: 140/mod/+accels  TOCO: q2-3mins  SVE: deferred, last  at 10:30    Labor Course:    17:50	cytotec  21:50	0/0/-3, cytotec #2  1:50	1/0/-3  3:05	epi  3:50	CRB 80/80  8:11	Pit  10:35	5/80/-2              AROM, cl    Pit currently @6    Labs:                        11.1   11.58 )-----------( 319      ( 2024 17:37 )             34.9           ABO RH Interpretation: A NEG (24 @ 17:37)    Urinalysis Basic - ( 2024 17:37 )    Color: Yellow / Appearance: Cloudy / S.018 / pH: x  Gluc: x / Ketone: Trace mg/dL  / Bili: Negative / Urobili: 0.2 mg/dL   Blood: x / Protein: Trace mg/dL / Nitrite: Negative   Leuk Esterase: Negative / RBC: 1 /HPF / WBC 19 /HPF   Sq Epi: x / Non Sq Epi: 9 /HPF / Bacteria: Moderate /HPF

## 2024-11-27 NOTE — OB PROVIDER LABOR PROGRESS NOTE - ASSESSMENT
A/P:  33yo  at 39w3d, GBS negative, here for elective IOL, s/p cytotec x1.    -cont EFM/toco  -clear liquid diet, IVF  -pain management prn  -cytotec #2 placed at 2150  -re-examine in 4 hours    
33y/o  at 39w4d, GBS neg, pregnancy complicated by Rh neg status (s/p rhogam ), hypothyroidism (on 75mcg unithroid daily), IVF pregnancy, IOL at term.    -s/p CRB  -s/p epidural  -continue pitocin  -SVE as clinically indicated  -continuous EFM/toco  -clear liquid diet  -75mcg unithyroid QD (same as pre-pregnancy dose)  
A/P:  33yo  at 39w3d, GBS negative, here for elective IOL, s/p cytotec x2.    -cont EFM/toco  -clear liquid diet, IVF  -pain management prn  -s/p epidural  -CRB 80/80 placed  -cytotec to be placed when contractions space out    
A/P:  33yo  at 39w3d, GBS negative, here for elective IOL, s/p cytotec x2.    -cont EFM/toco  -clear liquid diet, IVF  -pain management prn  -desiring epidural at this time  -CRB to be placed after epidural  -cytotec to be placed when contractions space out

## 2024-11-27 NOTE — OB RN DELIVERY SUMMARY - NSSELHIDDEN_OBGYN_ALL_OB_FT
[NS_DeliveryAttending1_OBGYN_ALL_OB_FT:[email or web address]=],[NS_DeliveryAssist1_OBGYN_ALL_OB_FT:[id number]=],[NS_DeliveryRN_OBGYN_ALL_OB_FT:[id number]=] [NS_DeliveryAttending1_OBGYN_ALL_OB_FT:[email or web address]=],[NS_DeliveryAssist1_OBGYN_ALL_OB_FT:[id number]=],[NS_DeliveryRN_OBGYN_ALL_OB_FT:[id number]=],[NS_CirculateRN2_OBGYN_ALL_OB_FT:[id number]=]

## 2024-11-27 NOTE — OB RN DELIVERY SUMMARY - NS_CIRCULATERN2_OBGYN_ALL_OB_FT
Yearly due 7/14/17.  Has scheduled for 7/18/17.  Gabapentin not [facility] med.  Sent to provider.  Thyroid and Doxy sent [facility].  Janna Wild RN    
levothyroxine (SYNTHROID/LEVOTHROID) 75 MCG tablet     Last Written Prescription Date: 4/17/17  Last Quantity: 90, # refills: 0  Last Office Visit with [facility], [facility] or [facility] prescribing provider: 7/14/2016     Next 5 appointments (look out 90 days)     Jul 18, 2017  8:00 AM CDT   PHYSICAL with Raul Newman MD   [facility]    [address] [place] [address] [phone]                   TSH   Date Value Ref Range Status   01/21/2017 2.60 0.40 - 4.00 mU/L Final     ________________________________________________________________________________    doxycycline Monohydrate 50 MG CAPS  Sig: Take 1-2 capsules by mouth daily as needed  Last Written Prescription Date: 8/29/16  Last Fill Quantity: 90,  # refills: 3   Last Office Visit with [facility], [facility] or [facility] prescribing provider:  7/14/2016            Associated Diagnoses   Rosacea [L71.9]  - Primary        ________________________________________________________________________________    gabapentin (NEURONTIN) 300 MG capsule      Last Written Prescription Date: 7/14/16  Last Quantity: 270, # refills: 3  Last Office Visit with [facility], [facility] or [facility] prescribing provider: 7/14/2016      Creatinine   Date Value Ref Range Status   07/14/2016 0.84 0.52 - 1.04 mg/dL Final     Lab Results   Component Value Date    AST 38 06/18/2013     Lab Results   Component Value Date    ALT 44 06/18/2013     BP Readings from Last 3 Encounters:   07/14/16 107/71   07/13/15 118/62   10/24/14 106/78         Routing refill request to provider for review/approval because:  Drug not on the [facility], [facility] or [facility] refill protocol or controlled substance    STEPHANIE Fairbanks  July 11, 2017  10:08 AM      
Tianna Reynolds - RN

## 2024-11-27 NOTE — OB PROVIDER LABOR PROGRESS NOTE - NS_SUBJECTIVE/OBJECTIVE_OBGYN_ALL_OB_FT
PGY 1 Note    Patient seen at bedside for evaluation of labor progression and placement of CRB.  Reports ctx. Comfortable s/p epidural.    T(F): 98.06 (23:46)  HR: 80 (03:53)  BP: 114/72 (03:45)  RR: 18 (03:20)    misoprostol 25 MICROGram(s) Vaginal once  misoprostol 25 MICROGram(s) Vaginal once    Labs:                        11.1   11.58 )-----------( 319      ( 2024 17:37 )             34.9           ABO RH Interpretation: A NEG (24 @ 17:37)    Urinalysis Basic - ( 2024 17:37 )    Color: Yellow / Appearance: Cloudy / S.018 / pH: x  Gluc: x / Ketone: Trace mg/dL  / Bili: Negative / Urobili: 0.2 mg/dL   Blood: x / Protein: Trace mg/dL / Nitrite: Negative   Leuk Esterase: Negative / RBC: 1 /HPF / WBC 19 /HPF   Sq Epi: x / Non Sq Epi: 9 /HPF / Bacteria: Moderate /HPF          Meds: influenza   Vaccine 0.5 milliLiter(s) IntraMuscular once  lactated ringers. 1000 milliLiter(s) IV Continuous <Continuous>  misoprostol 25 MICROGram(s) Vaginal once  oxytocin Infusion 167 milliUNIT(s)/Min IV Continuous <Continuous>  UNITHROID 75 MICROGram(s) 75 MICROGram(s) Oral daily
PGY 1 Note    Patient seen at bedside for evaluation of labor progression and re-examination.  Reports ctx.    T(F): 98.06 (23:46)  HR: 63 (01:53)  BP: 92/54 (01:53)  RR: 18 (01:53)    misoprostol 25 MICROGram(s) Vaginal once  misoprostol 25 MICROGram(s) Vaginal once    Labs:                        11.1   11.58 )-----------( 319      ( 2024 17:37 )             34.9           ABO RH Interpretation: A NEG (24 @ 17:37)    Urinalysis Basic - ( 2024 17:37 )    Color: Yellow / Appearance: Cloudy / S.018 / pH: x  Gluc: x / Ketone: Trace mg/dL  / Bili: Negative / Urobili: 0.2 mg/dL   Blood: x / Protein: Trace mg/dL / Nitrite: Negative   Leuk Esterase: Negative / RBC: 1 /HPF / WBC 19 /HPF   Sq Epi: x / Non Sq Epi: 9 /HPF / Bacteria: Moderate /HPF          Meds: influenza   Vaccine 0.5 milliLiter(s) IntraMuscular once  lactated ringers. 1000 milliLiter(s) IV Continuous <Continuous>  misoprostol 25 MICROGram(s) Vaginal once  oxytocin Infusion 167 milliUNIT(s)/Min IV Continuous <Continuous>  UNITHROID 75 MICROGram(s) 75 MICROGram(s) Oral daily
PGY 1 Note    Patient seen at bedside for evaluation of labor progression and re-examination.  Reports no ctx.    T(F): 98.06 (17:21)  HR: 90 (17:21)  BP: 119/75 (17:21)  RR: 20 (17:21)    misoprostol 25 MICROGram(s) Vaginal once  misoprostol 25 MICROGram(s) Vaginal once    Labs:                        11.1   11.58 )-----------( 319      ( 2024 17:37 )             34.9           ABO RH Interpretation: A NEG (24 @ 17:37)    Urinalysis Basic - ( 2024 17:37 )    Color: Yellow / Appearance: Cloudy / S.018 / pH: x  Gluc: x / Ketone: Trace mg/dL  / Bili: Negative / Urobili: 0.2 mg/dL   Blood: x / Protein: Trace mg/dL / Nitrite: Negative   Leuk Esterase: Negative / RBC: 1 /HPF / WBC 19 /HPF   Sq Epi: x / Non Sq Epi: 9 /HPF / Bacteria: Moderate /HPF          Meds: influenza   Vaccine 0.5 milliLiter(s) IntraMuscular once  lactated ringers. 1000 milliLiter(s) IV Continuous <Continuous>  oxytocin Infusion 167 milliUNIT(s)/Min IV Continuous <Continuous>  UNITHROID 75 MICROGram(s) 75 MICROGram(s) Oral daily    Labor course:  17:50	cytotec  21:50	0/0/-3, cytotec #2
PGY 3 Note    Patient seen at bedside for evaluation of labor progression, doing well, no complaints.     T(F): 98.42 (24 @ 12:33), Max: 98.42 (24 @ 12:33)  HR: 99 (24 @ 13:20) (63 - 132)  BP: 113/73 (24 @ 13:20) (89/47 - 135/92)  RR: 18 (24 @ 05:59) (18 - 20)  SpO2: 99% (24 @ 13:18) (94% - 100%)      Medications:  (ADM OVERRIDE): 1 Each (24 @ 17:00)  (ADM OVERRIDE): 1 Each (24 @ 03:35)  (ADM OVERRIDE): 1 Each (24 @ 08:03)  lactated ringers.: 125 mL/Hr (24 @ 15:52)  misoprostol: 25 MICROGram(s) (24 @ 17:51)  misoprostol: 25 MICROGram(s) (24 @ 21:57)  oxytocin Infusion.: 2 mL/Hr (24 @ 08:05)      Labs:                        11.1   11.58 )-----------( 319      ( 2024 17:37 )             34.9           ABO RH Interpretation: A NEG (24 @ 17:37)    Antibody Screen: POS (24 @ 17:37)    Urinalysis Basic - ( 2024 17:37 )    Color: Yellow / Appearance: Cloudy / S.018 / pH: x  Gluc: x / Ketone: Trace mg/dL  / Bili: Negative / Urobili: 0.2 mg/dL   Blood: x / Protein: Trace mg/dL / Nitrite: Negative   Leuk Esterase: Negative / RBC: 1 /HPF / WBC 19 /HPF   Sq Epi: x / Non Sq Epi: 9 /HPF / Bacteria: Moderate /HPF        Prenatal Syphilis Test: Nonreact (24 @ 17:37)

## 2024-11-27 NOTE — PROCEDURE NOTE - NSPATIENTPOSTION_GEN_A_CORE
Pt has been instructed to do IS every hour and do the flutter valve every two hours. Pt complete five breathes on IS and was SOB. Pt achieved a volume of 700ml. Have a goal of 1500ml. Pt is familiar with the flutter valve. Pt has a strong cough and desats with exertion.
sitting

## 2024-11-27 NOTE — PROCEDURE NOTE - NSANESTHEXAM_OBGYN_ALL_OB_FT
39 wk IOL 1cm  VSS  FH wnl    HX: Hypothyroid    Discussed with patient:    RISKS:  Hypotension; ADP with PDPH; Inadequate analgesia requiring replacement  Intrathecal catheter; intravascular catheter; backpain  BENEFITS:  Labor analgesia  ALTERNATIVES:  Intravenous analgesia with limitations and effect on     Accepts and consents

## 2024-11-27 NOTE — OB PROVIDER DELIVERY SUMMARY - NSPROVIDERDELIVERYNOTE_OBGYN_ALL_OB_FT
Patient was fully dilated and pushing. Head delivered OA, restituted to MAIKOL, Anterior shoulder delivered, followed by the posterior shoulder, rest of the body atraumatically. Baby suctioned, cord clamped and cut, and infant placed on mothers abdomen. Cord segment and blood obtained. Placenta delivered, intact. Fundus massaged and firm. Pitocin given postpartum. Vaginal vault, perineum, and cervix inspected, and second degree perineal laceration noted and repaired with 2-0 chromic in usual fashion, good hemostasis noted. Live female infant delivered.

## 2024-11-27 NOTE — OB PROVIDER DELIVERY SUMMARY - NSSELHIDDEN_OBGYN_ALL_OB_FT
[NS_DeliveryAttending1_OBGYN_ALL_OB_FT:[email or web address]=],[NS_DeliveryAssist1_OBGYN_ALL_OB_FT:[id number]=],[NS_DeliveryRN_OBGYN_ALL_OB_FT:[id number]=],[NS_CirculateRN2_OBGYN_ALL_OB_FT:[id number]=]

## 2024-11-28 LAB
BASOPHILS # BLD AUTO: 0.05 K/UL — SIGNIFICANT CHANGE UP (ref 0–0.2)
BASOPHILS NFR BLD AUTO: 0.3 % — SIGNIFICANT CHANGE UP (ref 0–1)
EOSINOPHIL # BLD AUTO: 0.13 K/UL — SIGNIFICANT CHANGE UP (ref 0–0.7)
EOSINOPHIL NFR BLD AUTO: 0.8 % — SIGNIFICANT CHANGE UP (ref 0–8)
FETAL SCREEN: SIGNIFICANT CHANGE UP
HCT VFR BLD CALC: 32.9 % — LOW (ref 37–47)
HGB BLD-MCNC: 10.3 G/DL — LOW (ref 12–16)
IMM GRANULOCYTES NFR BLD AUTO: 0.6 % — HIGH (ref 0.1–0.3)
LYMPHOCYTES # BLD AUTO: 1.1 K/UL — LOW (ref 1.2–3.4)
LYMPHOCYTES # BLD AUTO: 6.4 % — LOW (ref 20.5–51.1)
MCHC RBC-ENTMCNC: 23.9 PG — LOW (ref 27–31)
MCHC RBC-ENTMCNC: 31.3 G/DL — LOW (ref 32–37)
MCV RBC AUTO: 76.3 FL — LOW (ref 81–99)
MONOCYTES # BLD AUTO: 1.18 K/UL — HIGH (ref 0.1–0.6)
MONOCYTES NFR BLD AUTO: 6.8 % — SIGNIFICANT CHANGE UP (ref 1.7–9.3)
NEUTROPHILS # BLD AUTO: 14.69 K/UL — HIGH (ref 1.4–6.5)
NEUTROPHILS NFR BLD AUTO: 85.1 % — HIGH (ref 42.2–75.2)
NRBC # BLD: 0 /100 WBCS — SIGNIFICANT CHANGE UP (ref 0–0)
PLATELET # BLD AUTO: 295 K/UL — SIGNIFICANT CHANGE UP (ref 130–400)
PMV BLD: 10.3 FL — SIGNIFICANT CHANGE UP (ref 7.4–10.4)
RBC # BLD: 4.31 M/UL — SIGNIFICANT CHANGE UP (ref 4.2–5.4)
RBC # FLD: 15.5 % — HIGH (ref 11.5–14.5)
WBC # BLD: 17.26 K/UL — HIGH (ref 4.8–10.8)
WBC # FLD AUTO: 17.26 K/UL — HIGH (ref 4.8–10.8)

## 2024-11-28 PROCEDURE — 99231 SBSQ HOSP IP/OBS SF/LOW 25: CPT

## 2024-11-28 RX ORDER — PE/SHARK LIVER OIL/GLYC/WH.PET
1 CREAM (GRAM) RECTAL EVERY 12 HOURS
Refills: 0 | Status: DISCONTINUED | OUTPATIENT
Start: 2024-11-28 | End: 2024-11-29

## 2024-11-28 RX ORDER — INFLUENZA VIRUS VACCINE 15; 15; 15; 15 UG/.5ML; UG/.5ML; UG/.5ML; UG/.5ML
0.5 SUSPENSION INTRAMUSCULAR ONCE
Refills: 0 | Status: COMPLETED | OUTPATIENT
Start: 2024-11-28 | End: 2024-11-28

## 2024-11-28 RX ADMIN — SODIUM CHLORIDE 3 MILLILITER(S): 9 INJECTION, SOLUTION INTRAMUSCULAR; INTRAVENOUS; SUBCUTANEOUS at 21:05

## 2024-11-28 RX ADMIN — Medication 600 MILLIGRAM(S): at 13:29

## 2024-11-28 RX ADMIN — Medication 600 MILLIGRAM(S): at 05:41

## 2024-11-28 RX ADMIN — ACETAMINOPHEN 500MG 975 MILLIGRAM(S): 500 TABLET, COATED ORAL at 15:13

## 2024-11-28 RX ADMIN — .BETA.-CAROTENE, SODIUM ACETATE, ASCORBIC ACID, CHOLECALCIFEROL, .ALPHA.-TOCOPHEROL ACETATE, DL-, THIAMINE MONONITRATE, RIBOFLAVIN, NIACINAMIDE, PYRIDOXINE HYDROCHLORIDE, FOLIC ACID, CYANOCOBALAMIN, CALCIUM CARBONATE, FERROUS FUMARATE, ZINC OXIDE AND CUPRIC OXIDE 1 TABLET(S): 2000; 2000; 120; 400; 22; 1.84; 3; 20; 10; 1; 12; 200; 27; 25; 2 TABLET ORAL at 11:57

## 2024-11-28 RX ADMIN — SODIUM CHLORIDE 3 MILLILITER(S): 9 INJECTION, SOLUTION INTRAMUSCULAR; INTRAVENOUS; SUBCUTANEOUS at 15:48

## 2024-11-28 RX ADMIN — ACETAMINOPHEN 500MG 975 MILLIGRAM(S): 500 TABLET, COATED ORAL at 08:46

## 2024-11-28 RX ADMIN — Medication 1 APPLICATION(S): at 15:48

## 2024-11-28 RX ADMIN — Medication 600 MILLIGRAM(S): at 05:11

## 2024-11-28 RX ADMIN — WITCH HAZEL 1 APPLICATION(S): 50 SOLUTION RECTAL at 08:46

## 2024-11-28 RX ADMIN — ACETAMINOPHEN 500MG 975 MILLIGRAM(S): 500 TABLET, COATED ORAL at 15:48

## 2024-11-28 RX ADMIN — Medication 600 MILLIGRAM(S): at 18:18

## 2024-11-28 RX ADMIN — Medication 600 MILLIGRAM(S): at 11:48

## 2024-11-28 RX ADMIN — Medication 1 APPLICATION(S): at 08:51

## 2024-11-28 RX ADMIN — ACETAMINOPHEN 500MG 975 MILLIGRAM(S): 500 TABLET, COATED ORAL at 11:06

## 2024-11-28 RX ADMIN — Medication 5 MILLIGRAM(S): at 15:45

## 2024-11-28 RX ADMIN — Medication 1 SPRAY(S): at 08:45

## 2024-11-28 RX ADMIN — SODIUM CHLORIDE 3 MILLILITER(S): 9 INJECTION, SOLUTION INTRAMUSCULAR; INTRAVENOUS; SUBCUTANEOUS at 05:00

## 2024-11-28 NOTE — PROGRESS NOTE ADULT - SUBJECTIVE AND OBJECTIVE BOX
PGY1 Note    Patient seen and examined. Endorsing rectal pain associated with bearing down. Denies fever, chills, nausea, vomiting, chest pain, shortness of breath, severe abdominal pain, heavy vaginal bleeding. Patient is  Ambulating.   Passing flatus, Denies bowel movement.   Diet: Regular, tolerating PO  Voiding: Voiding without difficulty, no dysuria     MEDICATIONS  (STANDING):  acetaminophen     Tablet .. 975 milliGRAM(s) Oral <User Schedule>  diphtheria/tetanus/pertussis (acellular) Vaccine (Adacel) 0.5 milliLiter(s) IntraMuscular once  fentanyl (2 MICROgram(s)/mL) + bupivacaine 0.0625%  in 0.9% Sodium Chloride PCEA 250 milliLiter(s) Epidural PCA Continuous  hemorrhoidal Ointment 1 Application(s) Rectal every 12 hours  ibuprofen  Tablet. 600 milliGRAM(s) Oral every 6 hours  influenza   Vaccine 0.5 milliLiter(s) IntraMuscular once  misoprostol 25 MICROGram(s) Vaginal once  oxytocin Infusion 167 milliUNIT(s)/Min (167 mL/Hr) IV Continuous <Continuous>  oxytocin Infusion 167 milliUNIT(s)/Min (167 mL/Hr) IV Continuous <Continuous>  prenatal multivitamin 1 Tablet(s) Oral daily  sodium chloride 0.9% lock flush 3 milliLiter(s) IV Push every 8 hours  UNITHROID 75 MICROGram(s) 75 MICROGram(s) Oral daily    MEDICATIONS  (PRN):  benzocaine 20%/menthol 0.5% Spray 1 Spray(s) Topical every 6 hours PRN for Perineal discomfort  bisacodyl 5 milliGRAM(s) Oral every 12 hours PRN Constipation  dexAMETHasone  Injectable 4 milliGRAM(s) IV Push every 6 hours PRN Nausea  dibucaine 1% Ointment 1 Application(s) Topical every 6 hours PRN Perineal discomfort  diphenhydrAMINE 25 milliGRAM(s) Oral every 6 hours PRN Pruritus  hydrocortisone 1% Cream 1 Application(s) Topical every 6 hours PRN Moderate Pain (4-6)  lanolin Ointment 1 Application(s) Topical every 6 hours PRN nipple soreness  magnesium hydroxide Suspension 30 milliLiter(s) Oral two times a day PRN Constipation  naloxone Injectable 0.1 milliGRAM(s) IV Push every 3 minutes PRN For ANY of the following changes in patient status:  A. RR LESS THAN 10 breaths per minute, B. Oxygen saturation LESS THAN 90%, C. Sedation score of 6  ondansetron Injectable 4 milliGRAM(s) IV Push every 6 hours PRN Nausea  oxyCODONE    IR 5 milliGRAM(s) Oral every 3 hours PRN Moderate to Severe Pain (4-10)  oxyCODONE    IR 5 milliGRAM(s) Oral once PRN Moderate to Severe Pain (4-10)  pramoxine 1%/zinc 5% Cream 1 Application(s) Topical every 4 hours PRN Moderate Pain (4-6)  simethicone 80 milliGRAM(s) Chew every 4 hours PRN Gas  witch hazel Pads 1 Application(s) Topical every 4 hours PRN Perineal discomfort      Physical Exam  Vital Signs Last 24 Hrs  T(C): 36.6 (28 Nov 2024 07:28), Max: 37.1 (27 Nov 2024 20:37)  T(F): 97.9 (28 Nov 2024 07:28), Max: 98.7 (27 Nov 2024 20:37)  HR: 61 (28 Nov 2024 07:28) (56 - 140)  BP: 98/63 (28 Nov 2024 07:28) (91/46 - 135/92)  RR: 18 (28 Nov 2024 07:28) (18 - 18)  SpO2: 97% (28 Nov 2024 07:28) (74% - 100%)    Parameters below as of 28 Nov 2024 07:28  Patient On (Oxygen Delivery Method): room air      Gen: AAOx3, NAD  Ext: No calf tenderness, no swelling  Fundus: firm, below umbilicus. Nontender.   Abd: Soft, nontender, nondistended  Lochia: minimal     Labs:                        10.3   17.26 )-----------( 295      ( 28 Nov 2024 07:25 )             32.9                         11.1   11.58 )-----------( 319      ( 26 Nov 2024 17:37 )             34.9

## 2024-11-29 ENCOUNTER — TRANSCRIPTION ENCOUNTER (OUTPATIENT)
Age: 34
End: 2024-11-29

## 2024-11-29 ENCOUNTER — APPOINTMENT (OUTPATIENT)
Dept: ANTEPARTUM | Facility: CLINIC | Age: 34
End: 2024-11-29

## 2024-11-29 VITALS
HEART RATE: 76 BPM | TEMPERATURE: 98 F | OXYGEN SATURATION: 98 % | SYSTOLIC BLOOD PRESSURE: 121 MMHG | DIASTOLIC BLOOD PRESSURE: 72 MMHG | RESPIRATION RATE: 18 BRPM

## 2024-11-29 LAB
BASOPHILS # BLD AUTO: 0.05 K/UL — SIGNIFICANT CHANGE UP (ref 0–0.2)
BASOPHILS NFR BLD AUTO: 0.3 % — SIGNIFICANT CHANGE UP (ref 0–1)
EOSINOPHIL # BLD AUTO: 0.22 K/UL — SIGNIFICANT CHANGE UP (ref 0–0.7)
EOSINOPHIL NFR BLD AUTO: 1.3 % — SIGNIFICANT CHANGE UP (ref 0–8)
HCT VFR BLD CALC: 32.2 % — LOW (ref 37–47)
HGB BLD-MCNC: 10.1 G/DL — LOW (ref 12–16)
IMM GRANULOCYTES NFR BLD AUTO: 0.8 % — HIGH (ref 0.1–0.3)
LYMPHOCYTES # BLD AUTO: 1.46 K/UL — SIGNIFICANT CHANGE UP (ref 1.2–3.4)
LYMPHOCYTES # BLD AUTO: 8.7 % — LOW (ref 20.5–51.1)
MCHC RBC-ENTMCNC: 23.9 PG — LOW (ref 27–31)
MCHC RBC-ENTMCNC: 31.4 G/DL — LOW (ref 32–37)
MCV RBC AUTO: 76.3 FL — LOW (ref 81–99)
MONOCYTES # BLD AUTO: 1.06 K/UL — HIGH (ref 0.1–0.6)
MONOCYTES NFR BLD AUTO: 6.3 % — SIGNIFICANT CHANGE UP (ref 1.7–9.3)
NEUTROPHILS # BLD AUTO: 13.84 K/UL — HIGH (ref 1.4–6.5)
NEUTROPHILS NFR BLD AUTO: 82.6 % — HIGH (ref 42.2–75.2)
NRBC # BLD: 0 /100 WBCS — SIGNIFICANT CHANGE UP (ref 0–0)
PLATELET # BLD AUTO: 381 K/UL — SIGNIFICANT CHANGE UP (ref 130–400)
PMV BLD: 10.3 FL — SIGNIFICANT CHANGE UP (ref 7.4–10.4)
RBC # BLD: 4.22 M/UL — SIGNIFICANT CHANGE UP (ref 4.2–5.4)
RBC # FLD: 15.7 % — HIGH (ref 11.5–14.5)
WBC # BLD: 16.76 K/UL — HIGH (ref 4.8–10.8)
WBC # FLD AUTO: 16.76 K/UL — HIGH (ref 4.8–10.8)

## 2024-11-29 RX ORDER — PE/SHARK LIVER OIL/GLYC/WH.PET
1 CREAM (GRAM) RECTAL
Refills: 0 | Status: DISCONTINUED | OUTPATIENT
Start: 2024-11-29 | End: 2024-11-29

## 2024-11-29 RX ORDER — ACETAMINOPHEN 500MG 500 MG/1
3 TABLET, COATED ORAL
Qty: 120 | Refills: 0
Start: 2024-11-29 | End: 2024-12-08

## 2024-11-29 RX ORDER — .BETA.-CAROTENE, SODIUM ACETATE, ASCORBIC ACID, CHOLECALCIFEROL, .ALPHA.-TOCOPHEROL ACETATE, DL-, THIAMINE MONONITRATE, RIBOFLAVIN, NIACINAMIDE, PYRIDOXINE HYDROCHLORIDE, FOLIC ACID, CYANOCOBALAMIN, CALCIUM CARBONATE, FERROUS FUMARATE, ZINC OXIDE AND CUPRIC OXIDE 2000; 2000; 120; 400; 22; 1.84; 3; 20; 10; 1; 12; 200; 27; 25; 2 [IU]/1; [IU]/1; MG/1; [IU]/1; MG/1; MG/1; MG/1; MG/1; MG/1; MG/1; UG/1; MG/1; MG/1; MG/1; MG/1
1 TABLET ORAL
Qty: 0 | Refills: 0 | DISCHARGE
Start: 2024-11-29

## 2024-11-29 RX ORDER — GLYCERIN, LIDOCAINE, PETROLATUM, AND PHENYLEPHRINE HYDROCHLORIDE 144; 50; 150; 2.5 MG/G; MG/G; MG/G; MG/G
1 CREAM TOPICAL
Qty: 1 | Refills: 0
Start: 2024-11-29 | End: 2024-12-05

## 2024-11-29 RX ORDER — BENZOCAINE 10 %
1 OINTMENT (GRAM) TOPICAL
Qty: 0 | Refills: 0 | DISCHARGE
Start: 2024-11-29

## 2024-11-29 RX ORDER — IBUPROFEN 200 MG
1 TABLET ORAL
Qty: 0 | Refills: 0 | DISCHARGE
Start: 2024-11-29

## 2024-11-29 RX ORDER — HYDROCORTISONE BUTYRATE 0.1 %
1 CREAM (GRAM) TOPICAL DAILY
Refills: 0 | Status: DISCONTINUED | OUTPATIENT
Start: 2024-11-29 | End: 2024-11-29

## 2024-11-29 RX ORDER — SIMETHICONE 125 MG
1 CAPSULE ORAL
Qty: 0 | Refills: 0 | DISCHARGE
Start: 2024-11-29

## 2024-11-29 RX ORDER — DIBUCAINE 1 %
1 OINTMENT (GRAM) TOPICAL
Qty: 0 | Refills: 0 | DISCHARGE
Start: 2024-11-29

## 2024-11-29 RX ADMIN — .BETA.-CAROTENE, SODIUM ACETATE, ASCORBIC ACID, CHOLECALCIFEROL, .ALPHA.-TOCOPHEROL ACETATE, DL-, THIAMINE MONONITRATE, RIBOFLAVIN, NIACINAMIDE, PYRIDOXINE HYDROCHLORIDE, FOLIC ACID, CYANOCOBALAMIN, CALCIUM CARBONATE, FERROUS FUMARATE, ZINC OXIDE AND CUPRIC OXIDE 1 TABLET(S): 2000; 2000; 120; 400; 22; 1.84; 3; 20; 10; 1; 12; 200; 27; 25; 2 TABLET ORAL at 11:12

## 2024-11-29 RX ADMIN — ACETAMINOPHEN 500MG 975 MILLIGRAM(S): 500 TABLET, COATED ORAL at 08:49

## 2024-11-29 RX ADMIN — Medication 600 MILLIGRAM(S): at 06:21

## 2024-11-29 RX ADMIN — Medication 600 MILLIGRAM(S): at 06:51

## 2024-11-29 RX ADMIN — SODIUM CHLORIDE 3 MILLILITER(S): 9 INJECTION, SOLUTION INTRAMUSCULAR; INTRAVENOUS; SUBCUTANEOUS at 06:35

## 2024-11-29 RX ADMIN — Medication 600 MILLIGRAM(S): at 12:12

## 2024-11-29 RX ADMIN — Medication 1 SUPPOSITORY(S): at 13:13

## 2024-11-29 RX ADMIN — Medication 600 MILLIGRAM(S): at 11:12

## 2024-11-29 RX ADMIN — ACETAMINOPHEN 500MG 975 MILLIGRAM(S): 500 TABLET, COATED ORAL at 09:49

## 2024-11-29 NOTE — DISCHARGE NOTE OB - PATIENT PORTAL LINK FT
You can access the FollowMyHealth Patient Portal offered by [facility] by registering at the following website: [email or web address]. By joining [employer]’s FollowMyHealth portal, you will also be able to view your health information using other applications (apps) compatible with our system.

## 2024-11-29 NOTE — DISCHARGE NOTE OB - HOSPITAL COURSE
Patient presented to [facility] for induction of labor. Patient delivered uncomplicated. Patient received one dose of Rhogam for RH neg status. Vitals stable for discharge.

## 2024-11-29 NOTE — DISCHARGE NOTE OB - MEDICATION SUMMARY - MEDICATIONS TO TAKE
I will START or STAY ON the medications listed below when I get home from the hospital:    ibuprofen 600 mg oral tablet  -- 1 tab(s) by mouth every 6 hours  -- Indication: For pain    acetaminophen 325 mg oral tablet  -- 3 tab(s) by mouth every 6 hours as needed for  moderate pain  -- Indication: For pain    benzocaine 20% topical spray  -- 1 Apply on skin to affected area every 6 hours As needed for Perineal discomfort  -- Indication: For hemorrhoid    dibucaine 1% topical ointment  -- 1 Apply on skin to affected area every 6 hours As needed Perineal discomfort  -- Indication: For hemorrhoid    Prenatal Multivitamins with Folic Acid 1 mg oral tablet  -- 1 tab(s) by mouth once a day  -- Indication: For postpartum    simethicone 80 mg oral tablet, chewable  -- 1 tab(s) by mouth every 4 hours As needed Gas  -- Indication: For gas pain

## 2024-11-29 NOTE — DISCHARGE NOTE OB - FINANCIAL ASSISTANCE
[facility] provides services at a reduced cost to those who are determined to be eligible through [facility]’s financial assistance program. Information regarding [facility]’s financial assistance program can be found by going to [email or web address] or by calling [phone].

## 2024-11-29 NOTE — DISCHARGE NOTE OB - CARE PROVIDER_API CALL
Lorri White  Obstetrics and Gynecology  [address] [place] [id number]  Phone: [phone]  Fax: [phone]  Follow Up Time:

## 2024-11-29 NOTE — PROGRESS NOTE ADULT - ATTENDING COMMENTS
PPD2 s/p  with downtrending wbc  meeting milestones with labs and VS stable for discharge.   Return precautions given.   DC documentation provided

## 2024-11-29 NOTE — PROGRESS NOTE ADULT - SUBJECTIVE AND OBJECTIVE BOX
PGY1 Note    Patient seen and examined. Complaining of rectal pain when sitting up and breastfeeding, resolves on walking around.   Denies fever, chills, nausea, vomiting, chest pain, shortness of breath, severe abdominal pain, heavy vaginal bleeding. Patient is ambulating.   Passing flatus, Denies bowel movement.   Diet: Regular, tolerating PO  Voiding: Voiding without difficulty, no dysuria     MEDICATIONS  (STANDING):  acetaminophen     Tablet .. 975 milliGRAM(s) Oral <User Schedule>  diphtheria/tetanus/pertussis (acellular) Vaccine (Adacel) 0.5 milliLiter(s) IntraMuscular once  fentanyl (2 MICROgram(s)/mL) + bupivacaine 0.0625%  in 0.9% Sodium Chloride PCEA 250 milliLiter(s) Epidural PCA Continuous  hemorrhoidal Ointment 1 Application(s) Rectal every 12 hours  ibuprofen  Tablet. 600 milliGRAM(s) Oral every 6 hours  misoprostol 25 MICROGram(s) Vaginal once  oxytocin Infusion 167 milliUNIT(s)/Min (167 mL/Hr) IV Continuous <Continuous>  oxytocin Infusion 167 milliUNIT(s)/Min (167 mL/Hr) IV Continuous <Continuous>  prenatal multivitamin 1 Tablet(s) Oral daily  sodium chloride 0.9% lock flush 3 milliLiter(s) IV Push every 8 hours  UNITHROID 75 MICROGram(s) 75 MICROGram(s) Oral daily    MEDICATIONS  (PRN):  benzocaine 20%/menthol 0.5% Spray 1 Spray(s) Topical every 6 hours PRN for Perineal discomfort  bisacodyl 5 milliGRAM(s) Oral every 12 hours PRN Constipation  dexAMETHasone  Injectable 4 milliGRAM(s) IV Push every 6 hours PRN Nausea  dibucaine 1% Ointment 1 Application(s) Topical every 6 hours PRN Perineal discomfort  diphenhydrAMINE 25 milliGRAM(s) Oral every 6 hours PRN Pruritus  hydrocortisone 1% Cream 1 Application(s) Topical every 6 hours PRN Moderate Pain (4-6)  lanolin Ointment 1 Application(s) Topical every 6 hours PRN nipple soreness  magnesium hydroxide Suspension 30 milliLiter(s) Oral two times a day PRN Constipation  naloxone Injectable 0.1 milliGRAM(s) IV Push every 3 minutes PRN For ANY of the following changes in patient status:  A. RR LESS THAN 10 breaths per minute, B. Oxygen saturation LESS THAN 90%, C. Sedation score of 6  ondansetron Injectable 4 milliGRAM(s) IV Push every 6 hours PRN Nausea  oxyCODONE    IR 5 milliGRAM(s) Oral every 3 hours PRN Moderate to Severe Pain (4-10)  oxyCODONE    IR 5 milliGRAM(s) Oral once PRN Moderate to Severe Pain (4-10)  phenylephrine 0.25% Suppository 1 Suppository(s) Rectal two times a day PRN rectal pain  pramoxine 1%/zinc 5% Cream 1 Application(s) Topical every 4 hours PRN Moderate Pain (4-6)  simethicone 80 milliGRAM(s) Chew every 4 hours PRN Gas  witch hazel Pads 1 Application(s) Topical every 4 hours PRN Perineal discomfort      Physical Exam  Vital Signs Last 24 Hrs  T(C): 36.7 (28 Nov 2024 23:31), Max: 36.7 (28 Nov 2024 15:46)  T(F): 98 (28 Nov 2024 23:31), Max: 98 (28 Nov 2024 15:46)  HR: 67 (28 Nov 2024 23:31) (61 - 67)  BP: 96/63 (28 Nov 2024 23:31) (96/63 - 101/62)  RR: 18 (28 Nov 2024 23:31) (18 - 18)  SpO2: 98% (28 Nov 2024 23:31) (97% - 98%)    Parameters below as of 28 Nov 2024 15:46  Patient On (Oxygen Delivery Method): room air      Gen: AAOx3, NAD  Ext: No calf tenderness, no swelling  Fundus: firm, below umbilicus. Nontender.   Abd: Soft, nontender, nondistended  Lochia: minimal      Labs:                        10.3   17.26 )-----------( 295      ( 28 Nov 2024 07:25 )             32.9                         11.1   11.58 )-----------( 319      ( 26 Nov 2024 17:37 )             34.9         PGY1 Note    Patient seen and examined. Complaining of rectal pain when sitting up and breastfeeding, resolves on walking around.   Denies fever, chills, nausea, vomiting, chest pain, shortness of breath, severe abdominal pain, heavy vaginal bleeding. Patient is ambulating.   Passing flatus, Denies bowel movement.   Diet: Regular, tolerating PO  Voiding: Voiding without difficulty, no dysuria           Physical Exam  Vital Signs Last 24 Hrs  T(C): 36.7 (28 Nov 2024 23:31), Max: 36.7 (28 Nov 2024 15:46)  T(F): 98 (28 Nov 2024 23:31), Max: 98 (28 Nov 2024 15:46)  HR: 67 (28 Nov 2024 23:31) (61 - 67)  BP: 96/63 (28 Nov 2024 23:31) (96/63 - 101/62)  RR: 18 (28 Nov 2024 23:31) (18 - 18)  SpO2: 98% (28 Nov 2024 23:31) (97% - 98%)    Parameters below as of 28 Nov 2024 15:46  Patient On (Oxygen Delivery Method): room air      Gen: AAOx3, NAD  Ext: No calf tenderness, no swelling  Fundus: firm, below umbilicus. Nontender.   Abd: Soft, nontender, nondistended  Lochia: minimal      Labs:                        10.3   17.26 )-----------( 295      ( 28 Nov 2024 07:25 )             32.9                         11.1   11.58 )-----------( 319      ( 26 Nov 2024 17:37 )             34.9

## 2024-11-29 NOTE — DISCHARGE NOTE OB - BREASTFEEDING PROVIDES STABLE TEMPERATURE THROUGH SKIN TO SKIN CONTACT
Pt presents via Ems for dizziness. Per mom Pt has been having dizziness and headaches since 6/25/21. Today, dizzy episode that lasted 30 minutes, mom states Pt went pale, cold, and weak. Upon arrival to PED, Pt pale but awake, alert, and answering questions. Mom cartside.   
Statement Selected

## 2024-11-29 NOTE — DISCHARGE NOTE OB - NS MD DC FALL RISK RISK
For information on Fall & Injury Prevention, visit: [email or web address] OR  [email or web address] OR  https://www.cdc.gov/steadi/patient.html

## 2024-11-29 NOTE — PROGRESS NOTE ADULT - ASSESSMENT
33yo now P1 S/P  PPD 1, recovering well.    #Rh neg  - rhogam ordered  - f/u fetal screen    #Hemorrhoid  - preparation H  - dulcolax    #Routine post partum care  - breastfeeding  - unsure of contraception, wants to think about options  - encourage ambulation  - PO hydration  - Continue diet as tolerated   - Monitor vitals and bleeding  - f/u am cbc
33yo now P1 S/P  PPD 2, recovering well.    #Rh neg  - rhogam administeredx1 dose  - fetal screen negative    #Hemorrhoid  - benzocaine encouraged   - suppository ordered   - dulcolax    #Routine post partum care  - breastfeeding and bottlefeeding  - rediscuss contraception 6w pp  - encourage ambulation  - PO hydration  - Continue diet as tolerated   - Monitor vitals and bleeding  
33y/o  at 39w4d, GBS neg, pregnancy complicated by Rh neg status (s/p rhogam ), hypothyroidism (on 75mcg unithroid daily), IVF pregnancy, IOL at term.    -s/p CRB  -s/p epidural  -continue pitocin  -SVE as clinically indicated  -continuous EFM/toco  -clear liquid diet  -75mcg unithyroid QD (same as pre-pregnancy dose)

## 2024-12-05 DIAGNOSIS — Z28.09 IMMUNIZATION NOT CARRIED OUT BECAUSE OF OTHER CONTRAINDICATION: ICD-10-CM

## 2024-12-05 DIAGNOSIS — O26.893 OTHER SPECIFIED PREGNANCY RELATED CONDITIONS, THIRD TRIMESTER: ICD-10-CM

## 2024-12-05 DIAGNOSIS — Z3A.39 39 WEEKS GESTATION OF PREGNANCY: ICD-10-CM

## 2024-12-05 DIAGNOSIS — O09.813 SUPERVISION OF PREGNANCY RESULTING FROM ASSISTED REPRODUCTIVE TECHNOLOGY, THIRD TRIMESTER: ICD-10-CM

## 2024-12-05 DIAGNOSIS — E03.9 HYPOTHYROIDISM, UNSPECIFIED: ICD-10-CM

## 2024-12-05 DIAGNOSIS — B00.9 HERPESVIRAL INFECTION, UNSPECIFIED: ICD-10-CM

## 2024-12-11 ENCOUNTER — NON-APPOINTMENT (OUTPATIENT)
Age: 34
End: 2024-12-11

## 2025-01-09 ENCOUNTER — APPOINTMENT (OUTPATIENT)
Dept: OBGYN | Facility: CLINIC | Age: 35
End: 2025-01-09
Payer: COMMERCIAL

## 2025-01-09 ENCOUNTER — NON-APPOINTMENT (OUTPATIENT)
Age: 35
End: 2025-01-09

## 2025-01-09 VITALS
HEIGHT: 61 IN | DIASTOLIC BLOOD PRESSURE: 68 MMHG | SYSTOLIC BLOOD PRESSURE: 115 MMHG | WEIGHT: 131 LBS | BODY MASS INDEX: 24.73 KG/M2

## 2025-01-09 PROCEDURE — 99214 OFFICE O/P EST MOD 30 MIN: CPT

## 2025-01-15 LAB — HPV HIGH+LOW RISK DNA PNL CVX: NOT DETECTED

## 2025-01-17 LAB — CYTOLOGY CVX/VAG DOC THIN PREP: NORMAL

## 2025-01-29 ENCOUNTER — INPATIENT (INPATIENT)
Facility: HOSPITAL | Age: 35
LOS: 3 days | Discharge: HOME | DRG: 419 | End: 2025-02-02
Attending: SURGERY | Admitting: STUDENT IN AN ORGANIZED HEALTH CARE EDUCATION/TRAINING PROGRAM
Payer: COMMERCIAL

## 2025-01-29 VITALS
DIASTOLIC BLOOD PRESSURE: 80 MMHG | OXYGEN SATURATION: 100 % | SYSTOLIC BLOOD PRESSURE: 120 MMHG | WEIGHT: 125 LBS | TEMPERATURE: 98 F | HEART RATE: 67 BPM | RESPIRATION RATE: 18 BRPM

## 2025-01-29 DIAGNOSIS — F41.9 ANXIETY DISORDER, UNSPECIFIED: ICD-10-CM

## 2025-01-29 PROCEDURE — 99285 EMERGENCY DEPT VISIT HI MDM: CPT

## 2025-01-29 PROCEDURE — 93010 ELECTROCARDIOGRAM REPORT: CPT

## 2025-01-29 RX ORDER — SODIUM CHLORIDE 9 G/ML
1000 INJECTION, SOLUTION INTRAVENOUS ONCE
Refills: 0 | Status: COMPLETED | OUTPATIENT
Start: 2025-01-29 | End: 2025-01-29

## 2025-01-29 NOTE — ED ADULT TRIAGE NOTE - TEMPERATURE IN CELSIUS (DEGREES C)
[Patient] : patient [] :  [[employer] ] : [employer]   [FreeTextEntry1] : [id number] [FreeTextEntry2] : Dmitriy 36.7

## 2025-01-30 ENCOUNTER — RESULT REVIEW (OUTPATIENT)
Age: 35
End: 2025-01-30

## 2025-01-30 ENCOUNTER — TRANSCRIPTION ENCOUNTER (OUTPATIENT)
Age: 35
End: 2025-01-30

## 2025-01-30 DIAGNOSIS — K80.20 CALCULUS OF GALLBLADDER WITHOUT CHOLECYSTITIS WITHOUT OBSTRUCTION: ICD-10-CM

## 2025-01-30 LAB
ALBUMIN SERPL ELPH-MCNC: 4.1 G/DL — SIGNIFICANT CHANGE UP (ref 3.5–5.2)
ALBUMIN SERPL ELPH-MCNC: 4.5 G/DL — SIGNIFICANT CHANGE UP (ref 3.5–5.2)
ALP SERPL-CCNC: 301 U/L — HIGH (ref 30–115)
ALP SERPL-CCNC: 308 U/L — HIGH (ref 30–115)
ALT FLD-CCNC: 565 U/L — HIGH (ref 0–41)
ALT FLD-CCNC: 616 U/L — HIGH (ref 0–41)
ANION GAP SERPL CALC-SCNC: 12 MMOL/L — SIGNIFICANT CHANGE UP (ref 7–14)
ANION GAP SERPL CALC-SCNC: 13 MMOL/L — SIGNIFICANT CHANGE UP (ref 7–14)
AST SERPL-CCNC: 542 U/L — HIGH (ref 0–41)
AST SERPL-CCNC: 586 U/L — HIGH (ref 0–41)
BASOPHILS # BLD AUTO: 0.03 K/UL — SIGNIFICANT CHANGE UP (ref 0–0.2)
BASOPHILS # BLD AUTO: 0.03 K/UL — SIGNIFICANT CHANGE UP (ref 0–0.2)
BASOPHILS NFR BLD AUTO: 0.3 % — SIGNIFICANT CHANGE UP (ref 0–1)
BASOPHILS NFR BLD AUTO: 0.3 % — SIGNIFICANT CHANGE UP (ref 0–1)
BILIRUB SERPL-MCNC: 2.6 MG/DL — HIGH (ref 0.2–1.2)
BILIRUB SERPL-MCNC: 3.4 MG/DL — HIGH (ref 0.2–1.2)
BUN SERPL-MCNC: 12 MG/DL — SIGNIFICANT CHANGE UP (ref 10–20)
BUN SERPL-MCNC: 9 MG/DL — LOW (ref 10–20)
CALCIUM SERPL-MCNC: 8.8 MG/DL — SIGNIFICANT CHANGE UP (ref 8.4–10.5)
CALCIUM SERPL-MCNC: 9.5 MG/DL — SIGNIFICANT CHANGE UP (ref 8.4–10.5)
CHLORIDE SERPL-SCNC: 101 MMOL/L — SIGNIFICANT CHANGE UP (ref 98–110)
CHLORIDE SERPL-SCNC: 104 MMOL/L — SIGNIFICANT CHANGE UP (ref 98–110)
CO2 SERPL-SCNC: 21 MMOL/L — SIGNIFICANT CHANGE UP (ref 17–32)
CO2 SERPL-SCNC: 22 MMOL/L — SIGNIFICANT CHANGE UP (ref 17–32)
CREAT SERPL-MCNC: 0.7 MG/DL — SIGNIFICANT CHANGE UP (ref 0.7–1.5)
CREAT SERPL-MCNC: 0.9 MG/DL — SIGNIFICANT CHANGE UP (ref 0.7–1.5)
EGFR: 116 ML/MIN/1.73M2 — SIGNIFICANT CHANGE UP
EGFR: 86 ML/MIN/1.73M2 — SIGNIFICANT CHANGE UP
EOSINOPHIL # BLD AUTO: 0.17 K/UL — SIGNIFICANT CHANGE UP (ref 0–0.7)
EOSINOPHIL # BLD AUTO: 0.29 K/UL — SIGNIFICANT CHANGE UP (ref 0–0.7)
EOSINOPHIL NFR BLD AUTO: 1.9 % — SIGNIFICANT CHANGE UP (ref 0–8)
EOSINOPHIL NFR BLD AUTO: 3.1 % — SIGNIFICANT CHANGE UP (ref 0–8)
GLUCOSE SERPL-MCNC: 113 MG/DL — HIGH (ref 70–99)
GLUCOSE SERPL-MCNC: 95 MG/DL — SIGNIFICANT CHANGE UP (ref 70–99)
HCG SERPL QL: NEGATIVE — SIGNIFICANT CHANGE UP
HCT VFR BLD CALC: 39.9 % — SIGNIFICANT CHANGE UP (ref 37–47)
HCT VFR BLD CALC: 41.3 % — SIGNIFICANT CHANGE UP (ref 37–47)
HGB BLD-MCNC: 12.8 G/DL — SIGNIFICANT CHANGE UP (ref 12–16)
HGB BLD-MCNC: 13.3 G/DL — SIGNIFICANT CHANGE UP (ref 12–16)
IMM GRANULOCYTES NFR BLD AUTO: 0.3 % — SIGNIFICANT CHANGE UP (ref 0.1–0.3)
IMM GRANULOCYTES NFR BLD AUTO: 0.4 % — HIGH (ref 0.1–0.3)
LYMPHOCYTES # BLD AUTO: 0.62 K/UL — LOW (ref 1.2–3.4)
LYMPHOCYTES # BLD AUTO: 0.7 K/UL — LOW (ref 1.2–3.4)
LYMPHOCYTES # BLD AUTO: 6.7 % — LOW (ref 20.5–51.1)
LYMPHOCYTES # BLD AUTO: 7.7 % — LOW (ref 20.5–51.1)
MAGNESIUM SERPL-MCNC: 2 MG/DL — SIGNIFICANT CHANGE UP (ref 1.8–2.4)
MAGNESIUM SERPL-MCNC: 2.2 MG/DL — SIGNIFICANT CHANGE UP (ref 1.8–2.4)
MCHC RBC-ENTMCNC: 24.7 PG — LOW (ref 27–31)
MCHC RBC-ENTMCNC: 24.8 PG — LOW (ref 27–31)
MCHC RBC-ENTMCNC: 32.1 G/DL — SIGNIFICANT CHANGE UP (ref 32–37)
MCHC RBC-ENTMCNC: 32.2 G/DL — SIGNIFICANT CHANGE UP (ref 32–37)
MCV RBC AUTO: 76.6 FL — LOW (ref 81–99)
MCV RBC AUTO: 77.2 FL — LOW (ref 81–99)
MONOCYTES # BLD AUTO: 0.78 K/UL — HIGH (ref 0.1–0.6)
MONOCYTES # BLD AUTO: 0.86 K/UL — HIGH (ref 0.1–0.6)
MONOCYTES NFR BLD AUTO: 8.6 % — SIGNIFICANT CHANGE UP (ref 1.7–9.3)
MONOCYTES NFR BLD AUTO: 9.2 % — SIGNIFICANT CHANGE UP (ref 1.7–9.3)
NEUTROPHILS # BLD AUTO: 7.37 K/UL — HIGH (ref 1.4–6.5)
NEUTROPHILS # BLD AUTO: 7.48 K/UL — HIGH (ref 1.4–6.5)
NEUTROPHILS NFR BLD AUTO: 80.4 % — HIGH (ref 42.2–75.2)
NEUTROPHILS NFR BLD AUTO: 81.1 % — HIGH (ref 42.2–75.2)
NRBC # BLD: 0 /100 WBCS — SIGNIFICANT CHANGE UP (ref 0–0)
NRBC # BLD: 0 /100 WBCS — SIGNIFICANT CHANGE UP (ref 0–0)
NRBC BLD-RTO: 0 /100 WBCS — SIGNIFICANT CHANGE UP (ref 0–0)
NRBC BLD-RTO: 0 /100 WBCS — SIGNIFICANT CHANGE UP (ref 0–0)
PLATELET # BLD AUTO: 276 K/UL — SIGNIFICANT CHANGE UP (ref 130–400)
PLATELET # BLD AUTO: 295 K/UL — SIGNIFICANT CHANGE UP (ref 130–400)
PMV BLD: 10.4 FL — SIGNIFICANT CHANGE UP (ref 7.4–10.4)
PMV BLD: 10.4 FL — SIGNIFICANT CHANGE UP (ref 7.4–10.4)
POTASSIUM SERPL-MCNC: 4 MMOL/L — SIGNIFICANT CHANGE UP (ref 3.5–5)
POTASSIUM SERPL-MCNC: 4.1 MMOL/L — SIGNIFICANT CHANGE UP (ref 3.5–5)
POTASSIUM SERPL-SCNC: 4 MMOL/L — SIGNIFICANT CHANGE UP (ref 3.5–5)
POTASSIUM SERPL-SCNC: 4.1 MMOL/L — SIGNIFICANT CHANGE UP (ref 3.5–5)
PROT SERPL-MCNC: 6.2 G/DL — SIGNIFICANT CHANGE UP (ref 6–8)
PROT SERPL-MCNC: 7.2 G/DL — SIGNIFICANT CHANGE UP (ref 6–8)
RBC # BLD: 5.17 M/UL — SIGNIFICANT CHANGE UP (ref 4.2–5.4)
RBC # BLD: 5.39 M/UL — SIGNIFICANT CHANGE UP (ref 4.2–5.4)
RBC # FLD: 18.9 % — HIGH (ref 11.5–14.5)
RBC # FLD: 19 % — HIGH (ref 11.5–14.5)
SODIUM SERPL-SCNC: 136 MMOL/L — SIGNIFICANT CHANGE UP (ref 135–146)
SODIUM SERPL-SCNC: 137 MMOL/L — SIGNIFICANT CHANGE UP (ref 135–146)
TROPONIN T, HIGH SENSITIVITY RESULT: <6 NG/L — SIGNIFICANT CHANGE UP (ref 6–13)
WBC # BLD: 9.09 K/UL — SIGNIFICANT CHANGE UP (ref 4.8–10.8)
WBC # BLD: 9.31 K/UL — SIGNIFICANT CHANGE UP (ref 4.8–10.8)
WBC # FLD AUTO: 9.09 K/UL — SIGNIFICANT CHANGE UP (ref 4.8–10.8)
WBC # FLD AUTO: 9.31 K/UL — SIGNIFICANT CHANGE UP (ref 4.8–10.8)

## 2025-01-30 PROCEDURE — S2900: CPT

## 2025-01-30 PROCEDURE — C1769: CPT

## 2025-01-30 PROCEDURE — 71275 CT ANGIOGRAPHY CHEST: CPT | Mod: 26

## 2025-01-30 PROCEDURE — 76705 ECHO EXAM OF ABDOMEN: CPT | Mod: 26

## 2025-01-30 PROCEDURE — 43259 EGD US EXAM DUODENUM/JEJUNUM: CPT | Mod: XU

## 2025-01-30 PROCEDURE — 99223 1ST HOSP IP/OBS HIGH 75: CPT | Mod: 25

## 2025-01-30 PROCEDURE — 71046 X-RAY EXAM CHEST 2 VIEWS: CPT | Mod: 26

## 2025-01-30 PROCEDURE — 85730 THROMBOPLASTIN TIME PARTIAL: CPT

## 2025-01-30 PROCEDURE — 86870 RBC ANTIBODY IDENTIFICATION: CPT

## 2025-01-30 PROCEDURE — 88305 TISSUE EXAM BY PATHOLOGIST: CPT

## 2025-01-30 PROCEDURE — 80076 HEPATIC FUNCTION PANEL: CPT

## 2025-01-30 PROCEDURE — 83735 ASSAY OF MAGNESIUM: CPT

## 2025-01-30 PROCEDURE — 80048 BASIC METABOLIC PNL TOTAL CA: CPT

## 2025-01-30 PROCEDURE — 74018 RADEX ABDOMEN 1 VIEW: CPT

## 2025-01-30 PROCEDURE — 88305 TISSUE EXAM BY PATHOLOGIST: CPT | Mod: 26

## 2025-01-30 PROCEDURE — 88304 TISSUE EXAM BY PATHOLOGIST: CPT

## 2025-01-30 PROCEDURE — C1889: CPT

## 2025-01-30 PROCEDURE — 86901 BLOOD TYPING SEROLOGIC RH(D): CPT

## 2025-01-30 PROCEDURE — 36415 COLL VENOUS BLD VENIPUNCTURE: CPT

## 2025-01-30 PROCEDURE — 86850 RBC ANTIBODY SCREEN: CPT

## 2025-01-30 PROCEDURE — C9399: CPT

## 2025-01-30 PROCEDURE — 43262 ENDO CHOLANGIOPANCREATOGRAPH: CPT | Mod: XU

## 2025-01-30 PROCEDURE — 80053 COMPREHEN METABOLIC PANEL: CPT

## 2025-01-30 PROCEDURE — 84100 ASSAY OF PHOSPHORUS: CPT

## 2025-01-30 PROCEDURE — 43239 EGD BIOPSY SINGLE/MULTIPLE: CPT | Mod: XU

## 2025-01-30 PROCEDURE — 99222 1ST HOSP IP/OBS MODERATE 55: CPT

## 2025-01-30 PROCEDURE — 86880 COOMBS TEST DIRECT: CPT

## 2025-01-30 PROCEDURE — 74328 X-RAY BILE DUCT ENDOSCOPY: CPT | Mod: 26

## 2025-01-30 PROCEDURE — 85025 COMPLETE CBC W/AUTO DIFF WBC: CPT

## 2025-01-30 PROCEDURE — 88312 SPECIAL STAINS GROUP 1: CPT | Mod: 26

## 2025-01-30 PROCEDURE — 84484 ASSAY OF TROPONIN QUANT: CPT

## 2025-01-30 PROCEDURE — 81025 URINE PREGNANCY TEST: CPT

## 2025-01-30 PROCEDURE — 43264 ERCP REMOVE DUCT CALCULI: CPT | Mod: XU

## 2025-01-30 PROCEDURE — 85027 COMPLETE CBC AUTOMATED: CPT

## 2025-01-30 PROCEDURE — 88312 SPECIAL STAINS GROUP 1: CPT

## 2025-01-30 PROCEDURE — 93005 ELECTROCARDIOGRAM TRACING: CPT

## 2025-01-30 PROCEDURE — 86900 BLOOD TYPING SEROLOGIC ABO: CPT

## 2025-01-30 PROCEDURE — 99223 1ST HOSP IP/OBS HIGH 75: CPT

## 2025-01-30 PROCEDURE — 85610 PROTHROMBIN TIME: CPT

## 2025-01-30 RX ORDER — LEVOTHYROXINE SODIUM 25 UG/1
75 TABLET ORAL DAILY
Refills: 0 | Status: DISCONTINUED | OUTPATIENT
Start: 2025-01-30 | End: 2025-02-01

## 2025-01-30 RX ORDER — KETOROLAC TROMETHAMINE 10 MG
15 TABLET ORAL ONCE
Refills: 0 | Status: DISCONTINUED | OUTPATIENT
Start: 2025-01-30 | End: 2025-01-30

## 2025-01-30 RX ORDER — SODIUM CHLORIDE 9 G/ML
1000 INJECTION, SOLUTION INTRAVENOUS
Refills: 0 | Status: DISCONTINUED | OUTPATIENT
Start: 2025-01-30 | End: 2025-01-30

## 2025-01-30 RX ORDER — ACETAMINOPHEN 160 MG/5ML
1000 SUSPENSION ORAL ONCE
Refills: 0 | Status: COMPLETED | OUTPATIENT
Start: 2025-01-30 | End: 2025-01-30

## 2025-01-30 RX ORDER — LEVOTHYROXINE SODIUM 25 UG/1
1 TABLET ORAL
Refills: 0 | DISCHARGE

## 2025-01-30 RX ORDER — ACETAMINOPHEN 160 MG/5ML
650 SUSPENSION ORAL EVERY 6 HOURS
Refills: 0 | Status: DISCONTINUED | OUTPATIENT
Start: 2025-01-30 | End: 2025-01-30

## 2025-01-30 RX ORDER — SODIUM CHLORIDE 9 G/ML
1000 INJECTION, SOLUTION INTRAVENOUS
Refills: 0 | Status: DISCONTINUED | OUTPATIENT
Start: 2025-01-30 | End: 2025-02-01

## 2025-01-30 RX ORDER — ACETAMINOPHEN 160 MG/5ML
650 SUSPENSION ORAL EVERY 6 HOURS
Refills: 0 | Status: DISCONTINUED | OUTPATIENT
Start: 2025-01-30 | End: 2025-02-01

## 2025-01-30 RX ORDER — ONDANSETRON 4 MG/1
4 TABLET, ORALLY DISINTEGRATING ORAL EVERY 6 HOURS
Refills: 0 | Status: DISCONTINUED | OUTPATIENT
Start: 2025-01-30 | End: 2025-02-01

## 2025-01-30 RX ORDER — ENOXAPARIN SODIUM 100 MG/ML
40 INJECTION SUBCUTANEOUS EVERY 24 HOURS
Refills: 0 | Status: DISCONTINUED | OUTPATIENT
Start: 2025-01-30 | End: 2025-02-01

## 2025-01-30 RX ORDER — FAMOTIDINE 10 MG/ML
20 INJECTION INTRAVENOUS ONCE
Refills: 0 | Status: COMPLETED | OUTPATIENT
Start: 2025-01-30 | End: 2025-01-30

## 2025-01-30 RX ORDER — FAMOTIDINE 10 MG/ML
20 INJECTION INTRAVENOUS ONCE
Refills: 0 | Status: DISCONTINUED | OUTPATIENT
Start: 2025-01-30 | End: 2025-01-30

## 2025-01-30 RX ORDER — INDOMETHACIN 50 MG/1
100 SUPPOSITORY RECTAL ONCE
Refills: 0 | Status: COMPLETED | OUTPATIENT
Start: 2025-01-30 | End: 2025-01-30

## 2025-01-30 RX ADMIN — Medication 50 MILLIGRAM(S): at 00:53

## 2025-01-30 RX ADMIN — SODIUM CHLORIDE 70 MILLILITER(S): 9 INJECTION, SOLUTION INTRAVENOUS at 11:54

## 2025-01-30 RX ADMIN — SODIUM CHLORIDE 75 MILLILITER(S): 9 INJECTION, SOLUTION INTRAVENOUS at 17:34

## 2025-01-30 RX ADMIN — SODIUM CHLORIDE 1000 MILLILITER(S): 9 INJECTION, SOLUTION INTRAVENOUS at 00:00

## 2025-01-30 RX ADMIN — FAMOTIDINE 20 MILLIGRAM(S): 10 INJECTION INTRAVENOUS at 02:30

## 2025-01-30 RX ADMIN — INDOMETHACIN 100 MILLIGRAM(S): 50 SUPPOSITORY RECTAL at 14:00

## 2025-01-30 RX ADMIN — Medication 15 MILLIGRAM(S): at 01:20

## 2025-01-30 RX ADMIN — Medication 15 MILLIGRAM(S): at 08:51

## 2025-01-30 NOTE — CONSULT NOTE ADULT - SUBJECTIVE AND OBJECTIVE BOX
GENERAL SURGERY CONSULT NOTE    Patient: CLAY MUÑOZ , 34y ([birth year])Female   MRN: [id number]  Location: [address]  Visit: 01-30-25 Inpatient  Date: 01-30-25 @ 13:51    HPI:    The 34 year old female with PMH hypothyroidism and 9 weeks postpartum presenting with 1 week upper abdominal and lower chest discomfort that has worsened overnight. She initially thought it was an anxiety attack but pain worsened in epigastrium prompting ED eval. She never had this before. She denies eating fatty foods.   In Ed patient afebrile and HDS. Labs notable for AST/ALT in 550-600 range, Tbili 3.4, alk phos 308 all uptrending. RUQ u/s showed cholelithiasis without signs of cholecystitis, CBD 7mm. CTA chest negative for PE. Surgery consulted for eval of choledocholithiasis    PAST MEDICAL & SURGICAL HISTORY:  Hypothyroid      No significant past surgical history          Home Medications:  Unithroid 75 mcg (0.075 mg) oral tablet: 1 tab(s) orally once a day (30 Jan 2025 09:39)        VITALS:  T(F): 96.8 (01-30-25 @ 12:54), Max: 98.2 (01-30-25 @ 01:01)  HR: 50 (01-30-25 @ 12:57) (50 - 84)  BP: 100/66 (01-30-25 @ 12:57) (100/66 - 123/77)  RR: 18 (01-30-25 @ 12:57) (18 - 18)  SpO2: 100% (01-30-25 @ 12:57) (99% - 100%)    PHYSICAL EXAM:    General: well appearing, no acute distress  HEENT: pupils equal and reactive, EOM intact, mucous membranes moist, no scleral icterus  CV: RRR on radial exam  Pulm: breathing well on room air, no acute respiratory distress  Abdomen: soft, moderately tender in epigastrium, nondistended, no rebound or guarding  Ext: well perfused, no peripheral edema, no ROM or strength deficits  Neuro: alert and oriented x3, no focal sensory/motor deficits      MEDICATIONS  (STANDING):  enoxaparin Injectable 40 milliGRAM(s) SubCutaneous every 24 hours  indomethacin Suppository 100 milliGRAM(s) Rectal once  influenza   Vaccine 0.5 milliLiter(s) IntraMuscular once  lactated ringers. 1000 milliLiter(s) (70 mL/Hr) IV Continuous <Continuous>  levothyroxine 75 MICROGram(s) Oral daily    MEDICATIONS  (PRN):  acetaminophen     Tablet .. 650 milliGRAM(s) Oral every 6 hours PRN Severe Pain (7 - 10)      LAB/STUDIES:                        12.8   9.31  )-----------( 276      ( 30 Jan 2025 08:11 )             39.9     01-30    137  |  104  |  9[L]  ----------------------------<  95  4.1   |  21  |  0.7    Ca    8.8      30 Jan 2025 08:11  Mg     2.2     01-30    TPro  6.2  /  Alb  4.1  /  TBili  3.4[H]  /  DBili  x   /  AST  542[H]  /  ALT  616[H]  /  AlkPhos  308[H]  01-30      LIVER FUNCTIONS - ( 30 Jan 2025 08:11 )  Alb: 4.1 g/dL / Pro: 6.2 g/dL / ALK PHOS: 308 U/L / ALT: 616 U/L / AST: 542 U/L / GGT: x           Urinalysis Basic - ( 30 Jan 2025 08:11 )    Color: x / Appearance: x / SG: x / pH: x  Gluc: 95 mg/dL / Ketone: x  / Bili: x / Urobili: x   Blood: x / Protein: x / Nitrite: x   Leuk Esterase: x / RBC: x / WBC x   Sq Epi: x / Non Sq Epi: x / Bacteria: x                  IMAGING:  < from: US Abdomen Upper Quadrant Right (01.30.25 @ 02:06) >    FINDINGS:  Liver: Right hepatic lobe cysts measuring up to 2.1 cm. A few additional   smaller cysts also noted.  Bile ducts: Common bile duct measures 7 mm.  Gallbladder: Cholelithiasis. No wall thickening. No pericholecystic   fluid. Technologist reports a negative sonographic Partida sign.  Pancreas: Visualized portions are within normal limits.  Right kidney: 9.8 cm. No hydronephrosis.  Ascites: None.  IVC: Visualized portions are within normal limits.    IMPRESSION:  Cholelithiasis without sonographic evidence of acute cholecystitis.    Few hepatic cysts measuring up to 2.1 cm in the right lobe.    CBD just above the upper limits of normal, measuring 7 mm.    --- End of Report ---            < end of copied text >    ASSESSMENT:  34yF w/ PMHx of hypothyroidism and 9 weeks postpartum presenting with 1 week abdominal pain worsening in past 24 hours, poor PO tolerance. Labs notable for transaminitis with CBD 7mm on u/s. Surgery consulted for eval    PLAN:  - admitted to medicine  - advanced GI for ERCP today  - plan for post-ERCP cholecystectomy with admission  - trend LFTs  - pain control    Discussed with attending Dr. Kevin leavitt MD  PGY2 General Surgery    CONSULT SPECTRA: [id number]

## 2025-01-30 NOTE — H&P ADULT - ASSESSMENT
#Transaminitis  #RUQ pain 34 year old female, past medical history hypothyroidism, 9 weeks postpartum, who presents with abdominal pain, admitted for cholelithiasis    #Cholecystitis (R/o choledocholithiasis)  #Transaminitis  #RUQ pain  -Likely biliary colic  -No fever, wbc count on admission  -, , , T bili 2.6  -RUQ US-Cholelithiasis without evidence of cholecystitis, few hepatic cysts up to 2.1 cm, CBD dilation of 7mm  -[facility] cs-ERCP with EUS this afternoon    #Hypothyroidism  -Cw home unithyroid 75      #Disposition: From home  #Diet: NPO until procedure  #GI Prophylaxis: none  #DVT Prophylaxis: | Lovenox 40mg SubQ qHS  #Activity: Ambulate as tolerated  #Code Status: Full Code   34 year old female, past medical history hypothyroidism, 9 weeks postpartum, who presents with abdominal pain, admitted for cholelithiasis    #Cholecystitis (R/o choledocholithiasis)  #Transaminitis  #RUQ pain  -Likely biliary colic  -No fever, wbc count on admission  -, , , T bili 2.6  -RUQ US-Cholelithiasis without evidence of cholecystitis, few hepatic cysts up to 2.1 cm, CBD dilation of 7mm  -[facility] GI cs-ERCP with EUS this afternoon  -NPO w IV fluids  -Gen srg consult for plan of cholecystectomy    #Hypothyroidism  -Cw home unithyroid 75      #Disposition: From home  #Diet: NPO until procedure  #GI Prophylaxis: none  #DVT Prophylaxis:  Lovenox 40mg SubQ qHS  #Activity: Ambulate as tolerated  #Code Status: Full Code   34 year old female, past medical history hypothyroidism, 9 weeks postpartum, who presents with abdominal pain, admitted for cholelithiasis    #Cholelithiasis (R/o choledocholithiasis)  #Transaminitis  #RUQ pain  -Likely biliary colic  -No fever, wbc count on admission  -, , , T bili 2.6  -RUQ US-Cholelithiasis without evidence of cholecystitis, few hepatic cysts up to 2.1 cm, CBD dilation of 7mm  -[facility]vance GI cs-ERCP with EUS this afternoon  -NPO w IV fluids  -Gen srg consult for plan of cholecystectomy    #Hypothyroidism  -Cw home unithyroid 75      #Disposition: From home  #Diet: NPO until procedure  #GI Prophylaxis: none  #DVT Prophylaxis:  Lovenox 40mg SubQ qHS  #Activity: Ambulate as tolerated  #Code Status: Full Code

## 2025-01-30 NOTE — PATIENT PROFILE ADULT - DO YOU FEEL UNSAFE AT HOME, WORK, OR SCHOOL?
Left message to discuss test results.     ----- Message from Brandie Mejia MD sent at 2/8/2023 10:09 AM CST -----  Please notify patient of normal results   no

## 2025-01-30 NOTE — CONSULT NOTE ADULT - ASSESSMENT
Patient is a 33 y/o female with a past medical history of  hypothyroidism, 9 weeks postpartum, who presented to [facility] with abdominal pain. Patient notes pain started in the epigastric area and has been ongoing for the last week.  Pain can be sharp at times without any radiation.  Patient notes pain was worsened by meals but without any clear alleviating factors at home.  Patient notes to me she had similar pain 1 month ago but this pain was located specifically in the right upper quadrant and self resolving.  Patient denies any issue with her liver or gallbladder in the past.  Patient denies any supplementation or herbal intake.  Patient denies any toxic habits as has a 9-week-old child at home.    Elevated LFT mixed pattern. No signs of sepsis. CBD 7mm and stones noted in Gallbladder.    Biliary colic/ Elevated LFT  - NPO for now- she had a half of a Jello at 8am  - T sandhya increasing , CBD 7mm, concern for pending obstruction  - Surgical consult for consideration towards CCY  - Plan EUS +/- ERCP today  - Discussed risk of GI bleed and Pancreatitis from procedure but willing to proceed   - Will follow    Patient is a 35 y/o female with a past medical history of  hypothyroidism, 9 weeks postpartum, who presented to [facility] with abdominal pain. Patient notes pain started in the epigastric area and has been ongoing for the last week.  Pain can be sharp at times without any radiation.  Patient notes pain was worsened by meals but without any clear alleviating factors at home.  Patient notes to me she had similar pain 1 month ago but this pain was located specifically in the right upper quadrant and self resolving.  Patient denies any issue with her liver or gallbladder in the past.  Patient denies any supplementation or herbal intake.  Patient denies any toxic habits as has a 9-week-old child at home.    Elevated LFT mixed pattern. No signs of sepsis. CBD 7mm and stones noted in Gallbladder.    Biliary colic/ Elevated LFT/ possible choledocholithiasis   - NPO for now- she had a half of a Jello at 8am  - T sandhya increasing , CBD 7mm, concern for pending obstruction  - Surgical consult for consideration towards CCY  - Plan EUS +/- ERCP today  - Discussed risk of GI bleed, perforation, and Pancreatitis from procedure but willing to proceed   - Will follow     Hepatic cysts:  Out patient MRI   Follow-up with our GI Office [address] [facility] [phone] in 4 weeks of discharge

## 2025-01-30 NOTE — ED PROVIDER NOTE - CARE PLAN
Assessment and plan of treatment:	chest pain  ekg, labs, imagign, supportive care   1 Principal Discharge DX:	Cholelithiasis  Assessment and plan of treatment:	chest pain  ekg, labs, imagign, supportive care

## 2025-01-30 NOTE — ED PROVIDER NOTE - ATTENDING APP SHARED VISIT CONTRIBUTION OF CARE
pt co cp mid sternal dull ache intermitt for 1 week, pt sts she has hx of bad anxiety and panic and thought it was that. was rx xanax which helps. no sob, fever, chills, leg swelling or pain. post  9 weeks ago. no vaginal bleeding or dc. no urinary sx. no back pain. no ab pain. no n, v ,d. no hx of early cardiac disease in family.

## 2025-01-30 NOTE — H&P ADULT - NSHPLABSRESULTS_GEN_ALL_CORE
12.8   9.31  )-----------( 276      ( 30 Jan 2025 08:11 )             39.9     01-30    137  |  104  |  9[L]  ----------------------------<  95  4.1   |  21  |  0.7    Ca    8.8      30 Jan 2025 08:11  Mg     2.2     01-30    TPro  6.2  /  Alb  4.1  /  TBili  3.4[H]  /  DBili  x   /  AST  542[H]  /  ALT  616[H]  /  AlkPhos  308[H]  01-30

## 2025-01-30 NOTE — ASU PREOP CHECKLIST - DENTURES
Medication:   Requested Prescriptions     Pending Prescriptions Disp Refills    montelukast (SINGULAIR) 10 MG tablet [Pharmacy Med Name: MONTELUKAST SOD 10 MG TABLET] 30 tablet 1     Sig: TAKE ONE TABLET BY MOUTH DAILY     Last Filled:  07/06/020    Last appt: 7/2/2020   Next appt: 1/4/2021    Last OARRS: No flowsheet data found.
no

## 2025-01-30 NOTE — CONSULT NOTE ADULT - SUBJECTIVE AND OBJECTIVE BOX
Patient is a 33 y/o female with a past medical history of  hypothyroidism, 9 weeks postpartum, who presented to [facility] with abdominal pain. Patient notes pain started in the epigastric area and has been ongoing for the last week.  Pain can be sharp at times without any radiation.  Patient notes pain was worsened by meals but without any clear alleviating factors at home.  Patient notes to me she had similar pain 1 month ago but this pain was located specifically in the right upper quadrant and self resolving.  Patient denies any issue with her liver or gallbladder in the past.  Patient denies any supplementation or herbal intake.  Patient denies any toxic habits as has a 9-week-old child at home.      PAST MEDICAL & SURGICAL HISTORY:  Hypothyroid  No significant past surgical history    Social History  Denies Current Tobacco use  Denies Current ETOH use  Denies Current Illicit Drug use     Family Hx:  Father: Non Contributory   Mother: Non Contributory    MEDICATIONS  (STANDING):  enoxaparin Injectable 40 milliGRAM(s) SubCutaneous every 24 hours  influenza   Vaccine 0.5 milliLiter(s) IntraMuscular once  levothyroxine 75 MICROGram(s) Oral daily      Allergies  No Known Allergies      Review of Systems  General:  Denies Fatigue, Denies Fever, Denies Weakness ,Denies Weight Loss   HEENT: Denies Trouble Swallowing ,Denies  Sore Throat , Denies Change in hearing/vision/speech ,Denies Dizziness    Cardio: Denies  Chest Pain , Palpitations    Respiratory: Denies worsening of SOB, Denies Cough  Abdomen: See detailed HPI  Neuro: Denies Headache Denies Dizziness, Denies Paresthesias  MSK: Denies pain in Bones/Joints/Muscles   Psych: Patient denies depression, denies suicidal or homicidal ideations  Integ: Patient Denies rash, or new skin lesions     Vital Signs Last 24 Hrs  T(C): 36.7 (30 Jan 2025 04:47), Max: 36.8 (30 Jan 2025 01:01)  T(F): 98 (30 Jan 2025 04:47), Max: 98.2 (30 Jan 2025 01:01)  HR: 50 (30 Jan 2025 04:47) (50 - 67)  BP: 100/66 (30 Jan 2025 04:47) (100/66 - 123/77)  BP(mean): 77 (30 Jan 2025 04:47) (77 - 77)  RR: 18 (30 Jan 2025 04:47) (18 - 18)  SpO2: 100% (30 Jan 2025 04:47) (100% - 100%)    Parameters below as of 30 Jan 2025 04:47  Patient On (Oxygen Delivery Method): room air    Physical Exam  Gen: NAD  Head: NC/AT, no visible deformity  ENT: PERRLA, Sclera  , PERRLA Mucosal Membranes  Cardio: S1/S2 No S3/S4, Regular  Resp: CTA B/L  Abdomen: Soft, ND/ TTP RUQ and Epigastric area   Neuro: AAOx3  Extremities: FROM x 4  Skin: No jaundice, no excoriation       Labs:                        12.8   9.31  )-----------( 276      ( 30 Jan 2025 08:11 )             39.9       Auto Neutrophil %: 80.4 % (01-30-25 @ 08:11)  Auto Immature Granulocyte %: 0.3 % (01-30-25 @ 08:11)  Auto Neutrophil %: 81.1 % (01-30-25 @ 00:25)  Auto Immature Granulocyte %: 0.4 % (01-30-25 @ 00:25)    01-30    137  |  104  |  9[L]  ----------------------------<  95  4.1   |  21  |  0.7      Calcium: 8.8 mg/dL (01-30-25 @ 08:11)      LFTs:             6.2  | 3.4  | 542      ------------------[308     ( 30 Jan 2025 08:11 )  4.1  | x    | 616           Urinalysis Basic - ( 30 Jan 2025 08:11 )  Color: x / Appearance: x / SG: x / pH: x  Gluc: 95 mg/dL / Ketone: x  / Bili: x / Urobili: x   Blood: x / Protein: x / Nitrite: x   Leuk Esterase: x / RBC: x / WBC x   Sq Epi: x / Non Sq Epi: x / Bacteria: x        RADIOLOGY & ADDITIONAL STUDIES:  US Abdomen Upper Quadrant Right 01.30.25  IMPRESSION:  Cholelithiasis without sonographic evidence of acute cholecystitis.    Few hepatic cysts measuring up to 2.1 cm in the right lobe.    CBD just above the upper limits of normal, measuring 7 mm.

## 2025-01-30 NOTE — ED ADULT NURSE NOTE - CCCP TRG CHIEF CMPLNT
chest pain
See your pediatrician in 1-2 days after discharge. Return to hospital if concerning signs or altered mental status, dizziness, headache, bleeding.

## 2025-01-30 NOTE — CONSULT NOTE ADULT - ATTENDING COMMENTS
Trauma/Acute Care Surgery Attending Note Attestation  Patient was seen and examined bedside.  I reviewed the resident/PA note and agreed with above assessment and plan with following additions and corrections.    History as above. Seen after ERCP. Pain has resolved. No nausea or vomiting at the time of my evaluation. Feels much better.    T(C): 36.4 (01-30-25 @ 14:38), Max: 36.8 (01-30-25 @ 01:01)  HR: 59 (01-30-25 @ 15:08) (50 - 84)  BP: 125/75 (01-30-25 @ 15:08) (100/66 - 129/70)  RR: 18 (01-30-25 @ 15:08) (16 - 18)  SpO2: 100% (01-30-25 @ 15:08) (97% - 100%)      01-30-25 @ 07:01  -  01-30-25 @ 19:30  --------------------------------------------------------  IN:    dextrose 5% + sodium chloride 0.9%: 225 mL    Lactated Ringers: 210 mL  Total IN: 435 mL    OUT:  Total OUT: 0 mL    Total NET: 435 mL      I independently performed a medically appropriate exam. The exam was notable for soft, not tender, not distended abdomen.                          12.8   9.31  )-----------( 276      ( 30 Jan 2025 08:11 )             39.9     01-30    137  |  104  |  9[L]  ----------------------------<  95  4.1   |  21  |  0.7    Ca 8.8; Mg 2.2; Phos x       ( 30 Jan 2025 08:11 )  Alb: 4.1 g/dL / Pro: 6.2 g/dL / AlkPhos: 308 U/L / ALT: 616 U/L / AST: 542 U/L / GGT:x     / Tbili 3.4 mg/dL/ Dbili x     / Indbili x        RUQ U/S reviewed and interpreted by me: mildly dilated CBD with cholelithiasis    Assessment/Plan:  34y Female PMH as above admitted with choledocholithiasis now s/p ERCP and sphincterotomy. Discussed inpatient versus close outpatient cholecystectomy. Patient is understandably concerned about having surgery. Discussed that she does still have gallstones. There is a risk of recurrent choledocholithiasis, though sphincterotomy does mitigate that risk for the short term. Recommended that she undergo cholecystectomy, but that she can forgo it completely if risk of recurrence is acceptable to her. I did recommend that she undergo cholecystectomy prior to another pregnancy as she may develop biliary colic or cholecystitis while pregnant, which would mildly increase her risk of complication versus doing it now. Patient is concerned about scarring from the surgery and would prefer a surgeon who can do the surgery with incisions that are easily hid. I discussed wound healing is dependent on her body but that the wounds can be hidden potentially along the bikini line or umbilicus.  I offered robotic surgery with incisions along the bikini line, and she would prefer that option. I did discuss that bikini line incisions does increase the technical difficulty of the surgery somewhat. She will take the night to think about the surgery, but she is agreeable to be tentatively added on for tomorrow. Please keep NPO after night with IVF. Tentative plan for inpatient cholecystectomy tomorrow versus Saturday.    Abdoul Brown MD  Trauma/Acute Care Surgery/Surgical Critical Care Attending

## 2025-01-30 NOTE — PATIENT PROFILE ADULT - FALL HARM RISK - HARM RISK INTERVENTIONS
Communicate Risk of Fall with Harm to all staff/Monitor for mental status changes/Monitor gait and stability/Reinforce activity limits and safety measures with patient and family/Tailored Fall Risk Interventions/Use of alarms - bed, chair and/or voice tab/Visual Cue: Yellow wristband and red socks/Bed in lowest position, wheels locked, appropriate side rails in place/Call bell, personal items and telephone in reach/Instruct patient to call for assistance before getting out of bed or chair/Non-slip footwear when patient is out of bed/[place] to call system/Physically safe environment - no spills, clutter or unnecessary equipment/Purposeful Proactive Rounding/Room/bathroom lighting operational, light cord in reach

## 2025-01-30 NOTE — H&P ADULT - ATTENDING COMMENTS
Patient is a 34 year old female with past medical history hypothyroidism, 9 weeks postpartum, who presents with abdominal pain, admitted for acute symptomatic cholelithiasis    #Cholelithiasis (R/o choledocholithiasis)  #Transaminitis  -RUQ US-Cholelithiasis without evidence of cholecystitis, few hepatic cysts up to 2.1 cm, CBD dilation of 7mm  -Advance GI cs-ERCP with EUS this afternoon- f/up post op   -NPO w IV fluids  -Gen surgical consult for plan of cholecystectomy  - daily LFT's monitoring     #Hypothyroidism -C/w home unithyroid 75mcg po once daily     GI /DVT proph.     Code status: full code       Total time spent to complete patient's bedside assessment, review medical chart, discuss medical plan of care with covering medical team was more than 55 minutes with >50% of time spent face to face with patient, discussion with patient/family and/or coordination of care

## 2025-01-30 NOTE — H&P ADULT - HISTORY OF PRESENT ILLNESS
34 year old female, past medical history hypothyroidism, 9 weeks postpartum, who presents with abdominal pain since 1 week. Pain was  was in the epigastric region, non-radiating and associated with food intake. Denies fever,  hemoptysis, shortness of breath, vomiting, diarrhea.  vitals were unremarkable  Labs significant for , , , T bili 2.6  RUQ US Cholelithiasis without evidence of cholecystitis, few hepatic cysts up to 2.1 cm, CBD dilation of 7mm 34 year old female, past medical history hypothyroidism, 9 weeks postpartum, who presents with abdominal pain since 1 week. Pain was  was in the epigastric region, non-radiating and associated with food intake. Denies fever,  hemoptysis, shortness of breath, vomiting, diarrhea.  vitals were unremarkable  Labs significant for , , , T bili 2.6  RUQ US -Cholelithiasis without evidence of cholecystitis, few hepatic cysts up to 2.1 cm, CBD dilation of 7mm

## 2025-01-30 NOTE — ED PROVIDER NOTE - PHYSICAL EXAMINATION
CONSTITUTIONAL: non-toxic appearing female, nad  SKIN: skin exam is warm and dry  ENT: MMM  NECK: ROM intact.  CARD: S1, S2 normal, no murmur  RESP: No wheezes, rales or rhonchi. Good air movement bilaterally  ABD: soft; non-distended; non-tender.   EXT: Normal ROM.  NEURO: awake, alert, following commands, oriented, grossly unremarkable. No Focal deficits. GCS 15.   PSYCH: Cooperative

## 2025-01-30 NOTE — H&P ADULT - NSHPPHYSICALEXAM_GEN_ALL_CORE
GENERAL: No acute distress, well-developed  CHEST/LUNG: Clear to auscultation bilaterally; No wheeze, equal breath sounds bilaterally, respirations nonlabored  HEART: Regular rate and rhythm; No murmurs, rubs, or gallops  ABDOMEN: Soft, tenderness present in RUQ and epigastric region. Partida sign +ve  NEUROLOGY: AAOx3, non-focal, moves all extremities spontaneously

## 2025-01-30 NOTE — CONSULT NOTE ADULT - NS ATTEND AMEND GEN_ALL_CORE FT
Patient seen and examined during the GI rounds, case discussed with the GI PA, plan communicated to the primary team, assessment, recommendations and plan as above.

## 2025-01-30 NOTE — ED PROVIDER NOTE - CHIEF COMPLAINT
Patient called and is currently admitted to the hospital.  She is having some difficulty breathing and the nurse suggested she have her pressure looked at and possibly adjusted.  Informed the patient that I would message Dr. Cheng and see what he would like to do.  
The patient is a 34y Female complaining of chest pain.

## 2025-01-30 NOTE — ED ADULT NURSE NOTE - NSFALLUNIVINTERV_ED_ALL_ED
Bed/Stretcher in lowest position, wheels locked, appropriate side rails in place/Call bell, personal items and telephone in reach/Instruct patient to call for assistance before getting out of bed/chair/stretcher/Non-slip footwear applied when patient is off stretcher/[place] to call system/Physically safe environment - no spills, clutter or unnecessary equipment/Purposeful proactive rounding/Room/bathroom lighting operational, light cord in reach

## 2025-01-30 NOTE — ED ADULT NURSE NOTE - OBJECTIVE STATEMENT
pt c/o heart palpitations and chest tightness. pt states she is 9 weeks post partum. p tstates she took .5 xanax prior to coming thinking it was anxiety.

## 2025-01-30 NOTE — ED PROVIDER NOTE - OBJECTIVE STATEMENT
34 year old female, past medical history hypothyroidism, 9 weeks postpartum, who presents with chest pain. patient with intermittent chest pain and palpitations that began after delivery. patient with persistent central, non-radiating chest pain that worsened today. denies f/c, hemoptysis, shortness of breath, vomiting, diarrhea, leg pain/swelling. non-smoker.

## 2025-01-30 NOTE — ED PROVIDER NOTE - CLINICAL SUMMARY MEDICAL DECISION MAKING FREE TEXT BOX
pt pw cp, post partum 9 weeks. no infectious sx. cta to r/o pe was neg, pt had transaminitis and elev alk phos/bili. RUQ done and shows gs with cbd dilation. pt sx improved w pain meds. admitted for gi eval. she is not tender in RUQ, no wbc no fever, I don't think she has acute marquis at this timenor cholangitis (no fever/jaundice).

## 2025-01-31 LAB
ALBUMIN SERPL ELPH-MCNC: 3.3 G/DL — LOW (ref 3.5–5.2)
ALP SERPL-CCNC: 316 U/L — HIGH (ref 30–115)
ALT FLD-CCNC: 497 U/L — HIGH (ref 0–41)
ANION GAP SERPL CALC-SCNC: 8 MMOL/L — SIGNIFICANT CHANGE UP (ref 7–14)
AST SERPL-CCNC: 288 U/L — HIGH (ref 0–41)
BASOPHILS # BLD AUTO: 0.02 K/UL — SIGNIFICANT CHANGE UP (ref 0–0.2)
BASOPHILS NFR BLD AUTO: 0.4 % — SIGNIFICANT CHANGE UP (ref 0–1)
BILIRUB SERPL-MCNC: 3.8 MG/DL — HIGH (ref 0.2–1.2)
BUN SERPL-MCNC: 6 MG/DL — LOW (ref 10–20)
CALCIUM SERPL-MCNC: 8.2 MG/DL — LOW (ref 8.4–10.5)
CHLORIDE SERPL-SCNC: 111 MMOL/L — HIGH (ref 98–110)
CO2 SERPL-SCNC: 22 MMOL/L — SIGNIFICANT CHANGE UP (ref 17–32)
CREAT SERPL-MCNC: 0.7 MG/DL — SIGNIFICANT CHANGE UP (ref 0.7–1.5)
EGFR: 116 ML/MIN/1.73M2 — SIGNIFICANT CHANGE UP
EOSINOPHIL # BLD AUTO: 0.39 K/UL — SIGNIFICANT CHANGE UP (ref 0–0.7)
EOSINOPHIL NFR BLD AUTO: 8.1 % — HIGH (ref 0–8)
GLUCOSE SERPL-MCNC: 96 MG/DL — SIGNIFICANT CHANGE UP (ref 70–99)
HCT VFR BLD CALC: 35.9 % — LOW (ref 37–47)
HGB BLD-MCNC: 11.4 G/DL — LOW (ref 12–16)
IMM GRANULOCYTES NFR BLD AUTO: 0.6 % — HIGH (ref 0.1–0.3)
LYMPHOCYTES # BLD AUTO: 0.73 K/UL — LOW (ref 1.2–3.4)
LYMPHOCYTES # BLD AUTO: 15.2 % — LOW (ref 20.5–51.1)
MAGNESIUM SERPL-MCNC: 1.8 MG/DL — SIGNIFICANT CHANGE UP (ref 1.8–2.4)
MCHC RBC-ENTMCNC: 24.7 PG — LOW (ref 27–31)
MCHC RBC-ENTMCNC: 31.8 G/DL — LOW (ref 32–37)
MCV RBC AUTO: 77.9 FL — LOW (ref 81–99)
MONOCYTES # BLD AUTO: 0.69 K/UL — HIGH (ref 0.1–0.6)
MONOCYTES NFR BLD AUTO: 14.3 % — HIGH (ref 1.7–9.3)
NEUTROPHILS # BLD AUTO: 2.95 K/UL — SIGNIFICANT CHANGE UP (ref 1.4–6.5)
NEUTROPHILS NFR BLD AUTO: 61.4 % — SIGNIFICANT CHANGE UP (ref 42.2–75.2)
NRBC # BLD: 0 /100 WBCS — SIGNIFICANT CHANGE UP (ref 0–0)
NRBC BLD-RTO: 0 /100 WBCS — SIGNIFICANT CHANGE UP (ref 0–0)
PLATELET # BLD AUTO: 210 K/UL — SIGNIFICANT CHANGE UP (ref 130–400)
PMV BLD: 10.1 FL — SIGNIFICANT CHANGE UP (ref 7.4–10.4)
POTASSIUM SERPL-MCNC: 4 MMOL/L — SIGNIFICANT CHANGE UP (ref 3.5–5)
POTASSIUM SERPL-SCNC: 4 MMOL/L — SIGNIFICANT CHANGE UP (ref 3.5–5)
PROT SERPL-MCNC: 5.2 G/DL — LOW (ref 6–8)
RBC # BLD: 4.61 M/UL — SIGNIFICANT CHANGE UP (ref 4.2–5.4)
RBC # FLD: 18.6 % — HIGH (ref 11.5–14.5)
SODIUM SERPL-SCNC: 141 MMOL/L — SIGNIFICANT CHANGE UP (ref 135–146)
WBC # BLD: 4.81 K/UL — SIGNIFICANT CHANGE UP (ref 4.8–10.8)
WBC # FLD AUTO: 4.81 K/UL — SIGNIFICANT CHANGE UP (ref 4.8–10.8)

## 2025-01-31 PROCEDURE — 99232 SBSQ HOSP IP/OBS MODERATE 35: CPT | Mod: GC

## 2025-01-31 PROCEDURE — 99232 SBSQ HOSP IP/OBS MODERATE 35: CPT

## 2025-01-31 PROCEDURE — 99233 SBSQ HOSP IP/OBS HIGH 50: CPT

## 2025-01-31 RX ADMIN — LEVOTHYROXINE SODIUM 75 MICROGRAM(S): 25 TABLET ORAL at 06:10

## 2025-01-31 RX ADMIN — SODIUM CHLORIDE 75 MILLILITER(S): 9 INJECTION, SOLUTION INTRAVENOUS at 22:08

## 2025-01-31 RX ADMIN — ACETAMINOPHEN 650 MILLIGRAM(S): 160 SUSPENSION ORAL at 11:50

## 2025-01-31 RX ADMIN — ACETAMINOPHEN 650 MILLIGRAM(S): 160 SUSPENSION ORAL at 21:49

## 2025-01-31 RX ADMIN — SODIUM CHLORIDE 75 MILLILITER(S): 9 INJECTION, SOLUTION INTRAVENOUS at 07:43

## 2025-01-31 RX ADMIN — ACETAMINOPHEN 650 MILLIGRAM(S): 160 SUSPENSION ORAL at 11:20

## 2025-01-31 RX ADMIN — ACETAMINOPHEN 650 MILLIGRAM(S): 160 SUSPENSION ORAL at 20:49

## 2025-01-31 NOTE — PROGRESS NOTE ADULT - NS ATTEND AMEND GEN_ALL_CORE FT
Patient seen and examined during the GI advanced endoscopy rounds with GI Fellow and GI PA, GI Nutritionist, case discussed during rounds and discussed with primary team, assessment and plan as above

## 2025-02-01 ENCOUNTER — TRANSCRIPTION ENCOUNTER (OUTPATIENT)
Age: 35
End: 2025-02-01

## 2025-02-01 LAB
ALBUMIN SERPL ELPH-MCNC: 3.7 G/DL — SIGNIFICANT CHANGE UP (ref 3.5–5.2)
ALP SERPL-CCNC: 322 U/L — HIGH (ref 30–115)
ALT FLD-CCNC: 335 U/L — HIGH (ref 0–41)
ANION GAP SERPL CALC-SCNC: 12 MMOL/L — SIGNIFICANT CHANGE UP (ref 7–14)
ANION GAP SERPL CALC-SCNC: 13 MMOL/L — SIGNIFICANT CHANGE UP (ref 7–14)
APTT BLD: 33 SEC — SIGNIFICANT CHANGE UP (ref 27–39.2)
AST SERPL-CCNC: 87 U/L — HIGH (ref 0–41)
BASOPHILS # BLD AUTO: 0.03 K/UL — SIGNIFICANT CHANGE UP (ref 0–0.2)
BASOPHILS # BLD AUTO: 0.04 K/UL — SIGNIFICANT CHANGE UP (ref 0–0.2)
BASOPHILS NFR BLD AUTO: 0.4 % — SIGNIFICANT CHANGE UP (ref 0–1)
BASOPHILS NFR BLD AUTO: 0.6 % — SIGNIFICANT CHANGE UP (ref 0–1)
BILIRUB DIRECT SERPL-MCNC: 0.4 MG/DL — HIGH (ref 0–0.3)
BILIRUB INDIRECT FLD-MCNC: 0.5 MG/DL — SIGNIFICANT CHANGE UP (ref 0.2–1.2)
BILIRUB SERPL-MCNC: 0.9 MG/DL — SIGNIFICANT CHANGE UP (ref 0.2–1.2)
BUN SERPL-MCNC: 6 MG/DL — LOW (ref 10–20)
BUN SERPL-MCNC: 9 MG/DL — LOW (ref 10–20)
CALCIUM SERPL-MCNC: 8.3 MG/DL — LOW (ref 8.4–10.5)
CALCIUM SERPL-MCNC: 8.6 MG/DL — SIGNIFICANT CHANGE UP (ref 8.4–10.4)
CHLORIDE SERPL-SCNC: 105 MMOL/L — SIGNIFICANT CHANGE UP (ref 98–110)
CHLORIDE SERPL-SCNC: 107 MMOL/L — SIGNIFICANT CHANGE UP (ref 98–110)
CO2 SERPL-SCNC: 21 MMOL/L — SIGNIFICANT CHANGE UP (ref 17–32)
CO2 SERPL-SCNC: 22 MMOL/L — SIGNIFICANT CHANGE UP (ref 17–32)
CREAT SERPL-MCNC: 0.7 MG/DL — SIGNIFICANT CHANGE UP (ref 0.7–1.5)
CREAT SERPL-MCNC: 0.8 MG/DL — SIGNIFICANT CHANGE UP (ref 0.7–1.5)
EGFR: 116 ML/MIN/1.73M2 — SIGNIFICANT CHANGE UP
EGFR: 99 ML/MIN/1.73M2 — SIGNIFICANT CHANGE UP
EOSINOPHIL # BLD AUTO: 0.13 K/UL — SIGNIFICANT CHANGE UP (ref 0–0.7)
EOSINOPHIL # BLD AUTO: 0.51 K/UL — SIGNIFICANT CHANGE UP (ref 0–0.7)
EOSINOPHIL NFR BLD AUTO: 1.3 % — SIGNIFICANT CHANGE UP (ref 0–8)
EOSINOPHIL NFR BLD AUTO: 9.4 % — HIGH (ref 0–8)
GLUCOSE SERPL-MCNC: 104 MG/DL — HIGH (ref 70–99)
GLUCOSE SERPL-MCNC: 119 MG/DL — HIGH (ref 70–99)
HCT VFR BLD CALC: 35.7 % — LOW (ref 37–47)
HCT VFR BLD CALC: 40.4 % — SIGNIFICANT CHANGE UP (ref 37–47)
HGB BLD-MCNC: 11.2 G/DL — LOW (ref 12–16)
HGB BLD-MCNC: 12.6 G/DL — SIGNIFICANT CHANGE UP (ref 12–16)
IMM GRANULOCYTES NFR BLD AUTO: 0.2 % — SIGNIFICANT CHANGE UP (ref 0.1–0.3)
IMM GRANULOCYTES NFR BLD AUTO: 0.5 % — HIGH (ref 0.1–0.3)
INR BLD: 1.07 RATIO — SIGNIFICANT CHANGE UP (ref 0.65–1.3)
LYMPHOCYTES # BLD AUTO: 1.07 K/UL — LOW (ref 1.2–3.4)
LYMPHOCYTES # BLD AUTO: 1.27 K/UL — SIGNIFICANT CHANGE UP (ref 1.2–3.4)
LYMPHOCYTES # BLD AUTO: 12.9 % — LOW (ref 20.5–51.1)
LYMPHOCYTES # BLD AUTO: 19.7 % — LOW (ref 20.5–51.1)
MAGNESIUM SERPL-MCNC: 1.7 MG/DL — LOW (ref 1.8–2.4)
MCHC RBC-ENTMCNC: 24.4 PG — LOW (ref 27–31)
MCHC RBC-ENTMCNC: 24.7 PG — LOW (ref 27–31)
MCHC RBC-ENTMCNC: 31.2 G/DL — LOW (ref 32–37)
MCHC RBC-ENTMCNC: 31.4 G/DL — LOW (ref 32–37)
MCV RBC AUTO: 78.1 FL — LOW (ref 81–99)
MCV RBC AUTO: 78.6 FL — LOW (ref 81–99)
MONOCYTES # BLD AUTO: 0.2 K/UL — SIGNIFICANT CHANGE UP (ref 0.1–0.6)
MONOCYTES # BLD AUTO: 0.65 K/UL — HIGH (ref 0.1–0.6)
MONOCYTES NFR BLD AUTO: 12 % — HIGH (ref 1.7–9.3)
MONOCYTES NFR BLD AUTO: 2 % — SIGNIFICANT CHANGE UP (ref 1.7–9.3)
NEUTROPHILS # BLD AUTO: 3.16 K/UL — SIGNIFICANT CHANGE UP (ref 1.4–6.5)
NEUTROPHILS # BLD AUTO: 8.19 K/UL — HIGH (ref 1.4–6.5)
NEUTROPHILS NFR BLD AUTO: 58.1 % — SIGNIFICANT CHANGE UP (ref 42.2–75.2)
NEUTROPHILS NFR BLD AUTO: 82.9 % — HIGH (ref 42.2–75.2)
NRBC # BLD: 0 /100 WBCS — SIGNIFICANT CHANGE UP (ref 0–0)
NRBC # BLD: 0 /100 WBCS — SIGNIFICANT CHANGE UP (ref 0–0)
NRBC BLD-RTO: 0 /100 WBCS — SIGNIFICANT CHANGE UP (ref 0–0)
NRBC BLD-RTO: 0 /100 WBCS — SIGNIFICANT CHANGE UP (ref 0–0)
PHOSPHATE SERPL-MCNC: 3.6 MG/DL — SIGNIFICANT CHANGE UP (ref 2.1–4.9)
PLATELET # BLD AUTO: 213 K/UL — SIGNIFICANT CHANGE UP (ref 130–400)
PLATELET # BLD AUTO: 254 K/UL — SIGNIFICANT CHANGE UP (ref 130–400)
PMV BLD: 10.3 FL — SIGNIFICANT CHANGE UP (ref 7.4–10.4)
PMV BLD: 9.9 FL — SIGNIFICANT CHANGE UP (ref 7.4–10.4)
POTASSIUM SERPL-MCNC: 3.9 MMOL/L — SIGNIFICANT CHANGE UP (ref 3.5–5)
POTASSIUM SERPL-MCNC: 4 MMOL/L — SIGNIFICANT CHANGE UP (ref 3.5–5)
POTASSIUM SERPL-SCNC: 3.9 MMOL/L — SIGNIFICANT CHANGE UP (ref 3.5–5)
POTASSIUM SERPL-SCNC: 4 MMOL/L — SIGNIFICANT CHANGE UP (ref 3.5–5)
PROT SERPL-MCNC: 6 G/DL — SIGNIFICANT CHANGE UP (ref 6–8)
PROTHROM AB SERPL-ACNC: 12.6 SEC — SIGNIFICANT CHANGE UP (ref 9.95–12.87)
RBC # BLD: 4.54 M/UL — SIGNIFICANT CHANGE UP (ref 4.2–5.4)
RBC # BLD: 5.17 M/UL — SIGNIFICANT CHANGE UP (ref 4.2–5.4)
RBC # FLD: 18.9 % — HIGH (ref 11.5–14.5)
RBC # FLD: 19.9 % — HIGH (ref 11.5–14.5)
SODIUM SERPL-SCNC: 139 MMOL/L — SIGNIFICANT CHANGE UP (ref 135–146)
SODIUM SERPL-SCNC: 141 MMOL/L — SIGNIFICANT CHANGE UP (ref 135–146)
WBC # BLD: 5.43 K/UL — SIGNIFICANT CHANGE UP (ref 4.8–10.8)
WBC # BLD: 9.88 K/UL — SIGNIFICANT CHANGE UP (ref 4.8–10.8)
WBC # FLD AUTO: 5.43 K/UL — SIGNIFICANT CHANGE UP (ref 4.8–10.8)
WBC # FLD AUTO: 9.88 K/UL — SIGNIFICANT CHANGE UP (ref 4.8–10.8)

## 2025-02-01 PROCEDURE — 47563 LAPARO CHOLECYSTECTOMY/GRAPH: CPT | Mod: AS

## 2025-02-01 PROCEDURE — 88304 TISSUE EXAM BY PATHOLOGIST: CPT | Mod: 26

## 2025-02-01 PROCEDURE — 99233 SBSQ HOSP IP/OBS HIGH 50: CPT

## 2025-02-01 PROCEDURE — 86077 PHYS BLOOD BANK SERV XMATCH: CPT

## 2025-02-01 PROCEDURE — 99232 SBSQ HOSP IP/OBS MODERATE 35: CPT | Mod: 25

## 2025-02-01 RX ORDER — ACETAMINOPHEN 160 MG/5ML
2 SUSPENSION ORAL
Qty: 0 | Refills: 0 | DISCHARGE
Start: 2025-02-01

## 2025-02-01 RX ORDER — HYDROMORPHONE HYDROCHLORIDE 4 MG/ML
1 INJECTION, SOLUTION INTRAMUSCULAR; INTRAVENOUS; SUBCUTANEOUS
Refills: 0 | Status: DISCONTINUED | OUTPATIENT
Start: 2025-02-01 | End: 2025-02-02

## 2025-02-01 RX ORDER — MAGNESIUM SULFATE 0.8 MEQ/ML
2 AMPUL (ML) INJECTION ONCE
Refills: 0 | Status: COMPLETED | OUTPATIENT
Start: 2025-02-01 | End: 2025-02-01

## 2025-02-01 RX ORDER — ONDANSETRON 4 MG/1
4 TABLET, ORALLY DISINTEGRATING ORAL ONCE
Refills: 0 | Status: DISCONTINUED | OUTPATIENT
Start: 2025-02-01 | End: 2025-02-02

## 2025-02-01 RX ORDER — HYDROMORPHONE HYDROCHLORIDE 4 MG/ML
0.5 INJECTION, SOLUTION INTRAMUSCULAR; INTRAVENOUS; SUBCUTANEOUS
Refills: 0 | Status: DISCONTINUED | OUTPATIENT
Start: 2025-02-01 | End: 2025-02-02

## 2025-02-01 RX ORDER — SODIUM CHLORIDE 9 G/ML
1000 INJECTION, SOLUTION INTRAVENOUS
Refills: 0 | Status: DISCONTINUED | OUTPATIENT
Start: 2025-02-01 | End: 2025-02-02

## 2025-02-01 RX ORDER — ENOXAPARIN SODIUM 100 MG/ML
40 INJECTION SUBCUTANEOUS EVERY 24 HOURS
Refills: 0 | Status: DISCONTINUED | OUTPATIENT
Start: 2025-02-01 | End: 2025-02-02

## 2025-02-01 RX ORDER — ACETAMINOPHEN 160 MG/5ML
650 SUSPENSION ORAL EVERY 6 HOURS
Refills: 0 | Status: DISCONTINUED | OUTPATIENT
Start: 2025-02-01 | End: 2025-02-02

## 2025-02-01 RX ORDER — LEVOTHYROXINE SODIUM 25 UG/1
75 TABLET ORAL DAILY
Refills: 0 | Status: DISCONTINUED | OUTPATIENT
Start: 2025-02-01 | End: 2025-02-02

## 2025-02-01 RX ORDER — OXYCODONE HYDROCHLORIDE 30 MG/1
1 TABLET ORAL
Qty: 5 | Refills: 0
Start: 2025-02-01

## 2025-02-01 RX ADMIN — SODIUM CHLORIDE 75 MILLILITER(S): 9 INJECTION, SOLUTION INTRAVENOUS at 10:28

## 2025-02-01 RX ADMIN — Medication 25 GRAM(S): at 08:24

## 2025-02-01 RX ADMIN — HYDROMORPHONE HYDROCHLORIDE 0.5 MILLIGRAM(S): 4 INJECTION, SOLUTION INTRAMUSCULAR; INTRAVENOUS; SUBCUTANEOUS at 20:00

## 2025-02-01 RX ADMIN — HYDROMORPHONE HYDROCHLORIDE 0.5 MILLIGRAM(S): 4 INJECTION, SOLUTION INTRAMUSCULAR; INTRAVENOUS; SUBCUTANEOUS at 19:50

## 2025-02-01 NOTE — PRE-OP CHECKLIST - VERIFY SURGICAL SITE/SIDE WITH PATIENT
59 year old female presents for follow up on her hypertension.  Medical assistance note is erroneously.  Patient had her depression follow-up last month.  Last month we had increased her hydrochlorothiazide to 25 mg. She had plan to join weight watchers but she has not done so.  She actually has not been exercising as regularly.  She has been trying to watch her eating habits.  Blood pressure at home 129//94.  Currently on losartan, hydrochlorothiazide, metoprolol XL.  Has not tolerated amlodipine in the past due to ankle swelling.  REVIEW OF SYSTEMS:  Patient denies any headaches, SOB (shortness of breath), chest pain, or peripheral edema.  Past medical history including allergies, medications, adult illnesses, previous labs reviewed.    EXAM:  Blood pressure (!) 146/82, pulse 63, temperature 96.6 °F (35.9 °C), temperature source Temporal, weight 86.8 kg, last menstrual period 12/09/2014, SpO2 96 %. Weight up 1.8 kg since last month.  Rechecked blood pressure was 134/68 right arm large cuff.        Well developed, well nourished, non-ill appearing, no acute distress.        HEART:  regular rate and rhythm and no murmurs, clicks, or gallops        LUNGS:  clear to auscultation        EXTREMITIES:  no clubbing, cyanosis, edema or deformity noted    ASSESSMENT:    1. Essential hypertension          PLAN: 1)  continue current medications      I am going to leave her current blood pressure medications intact.  Encouraged her work aggressively on diet, exercise, weight loss, resume weight watchers.  She has an appointment in 5 weeks for a preop exam and will recheck at that time.  Bring her cuff with her.  If no further improvement consider additional medication.  Will wait check BMP in December with her preop exam.              Encouraged to continue to watch salt and ETOH (alcohol) intake and to         exercise at least 3 times per week.        See orders.  
Chief Complaint:  Chastity Copeland is here today for   Chief Complaint   Patient presents with   • Depression        Medications: medications verified, no change  Refills needed today?  YES  denies Latex allergy or sensitivity  Tobacco history: verified  Health Maintenance Due   Topic Date Due   • Medicare Wellness Visit  01/01/2017     Patient is due for topics as listed above but will discuss with PCP   Health Maintenance Summary     Medicare Wellness Visit (Yearly)  Overdue since 1/1/2017    DM/CKD Microalbumin (Yearly)  Next due on 5/26/2021    Pneumococcal Vaccine 0-64 (2 of 3 - PCV13)  Next due on 8/4/2021    DM/CKD GFR (Yearly)  Next due on 8/19/2021    Breast Cancer Screening (Every 2 Years)  Next due on 9/29/2022    Cervical Cancer Screening HPV CO-Testing (Every 5 Years)  Next due on 12/10/2024    Colorectal Cancer Screen-   Next due on 1/4/2026    DTaP/Tdap/Td Vaccine (4 - Td)  Next due on 8/4/2030    Hepatitis C Screening   Completed    Shingles Vaccine   Completed    Influenza Vaccine   Completed    Hepatitis B Vaccine   Aged Out    Meningococcal Vaccine   Aged Out    HPV Vaccine   Aged Out          Patient would like communication of their results via:      Sarah  
done

## 2025-02-01 NOTE — PROGRESS NOTE ADULT - ASSESSMENT
Patient is a 35 y/o female with a past medical history of  hypothyroidism, 9 weeks postpartum, who presented to [facility] with abdominal pain. Patient s/p ERCP with sphincterotomy, remvoal of stone and sludge along with ductal irrigation. No stent was placed. She feels much better today and denies abdominal pain, nausea, emesis, fevers, or Melena. Possibly going to OR today for CCY.       Biliary colic/ Elevated LFT/ possible choledocholithiasis   - NPO for now  - Transaminases improved  - Pain and abdominal exam better  - Awaiting OR    Hepatic cysts:  Out patient MRI   Follow-up with our GI Office [address] [facility] [phone] in 4 weeks of discharge with Dr Boudreaux
A/P:  CLAY MUÑOZ is a 34yF w/ PMHx of hypothyroidism and 9 weeks postpartum presenting with 1 week abdominal pain worsening in past 24 hours, poor PO tolerance. Labs notable for transaminitis with CBD 7mm on u/s.     PLAN:   - Pt going to OR 2/1  - Keep NPO for now  - IVF while NPO  - monitor LFTs  - daily labs, replete electrolytes as needed   - multi modal pain control  - DVT ppx   - encourage ambulation and IS as tolerated 
34 year old female, past medical history hypothyroidism, 9 weeks postpartum, who presents with abdominal pain, admitted for cholelithiasis    #Cholelithiasis  #choledocholithiasis  #Transaminitis  #RUQ pain  - S/p ERCP and CBD stone removal 1/30/2025  - RUQ US: Cholelithiasis without evidence of cholecystitis, few hepatic cysts up to 2.1 cm, CBD dilation of 7mm  - Cholecystectomy by surgery today 1/31/2025  - NPO w IV fluids till procedure.      #Hypothyroidism  -Cw home unithyroid 75      #Diet: NPO until procedure  #GI Prophylaxis: none  #DVT Prophylaxis:  Lovenox 40mg SubQ qHS  #Activity: Ambulate as tolerated      Handoff:  - resume diet as per surgery.    
34 year old female, past medical history hypothyroidism, 9 weeks postpartum, who presents with abdominal pain, admitted for cholelithiasis    #Cholelithiasis  #choledocholithiasis  #Transaminitis  #RUQ pain  - S/p ERCP and CBD stone removal 1/30/2025  - RUQ US: Cholelithiasis without evidence of cholecystitis, few hepatic cysts up to 2.1 cm, CBD dilation of 7mm  - Cholecystectomy by surgery today 2/1/2025  - NPO w IV fluids till procedure.    #Hypothyroidism  -Cw home unithyroid 75    #Diet: NPO until procedure  #GI Prophylaxis: none  #DVT Prophylaxis:  Lovenox 40mg SubQ qHS  #Activity: Ambulate as tolerated      Handoff:  - resume diet as per surgery.

## 2025-02-01 NOTE — BRIEF OPERATIVE NOTE - NSICDXBRIEFPROCEDURE_GEN_ALL_CORE_FT
PROCEDURES:  Robot-assisted cholecystectomy 01-Feb-2025 19:49:03  Connie Landeros  Laparoscopic cholecystectomy with repair of umbilical hernia 01-Feb-2025 19:50:59  Connie Landeros

## 2025-02-01 NOTE — DISCHARGE NOTE PROVIDER - NSDCCPCAREPLAN_GEN_ALL_CORE_FT
PRINCIPAL DISCHARGE DIAGNOSIS  Diagnosis: Cholelithiasis  Assessment and Plan of Treatment: Diet: Continue with clear liqud diet until you have bowel function and then advance as tolerated to regular diet. Avoi8d spicy, fatty foods as these can cause diarrhea in the first 2 weeks post-operatively.   Activity: Recommmend bed rest for first 24-48h, but can ambulate as tolerated and with assistance as needed.   Dressings: Your incsions are covered with skin glue and steri-strips; these will fall off on their own. Do not pick at the glue or scrub your wounds. You may shower but do not bathe. May use ice packs for pain and swelling.   Pain: You can take over the counter medications such as Tylenol and Ibuprofen for pain control. Recommend taking two extra strength tylenol with 1 advil (3 tablets) every 6 hours regardless if patient has pain or not. Prescription for oxycodone was sent to your pharmacy. Please take this medication for SEVERE PAIN ONLY. Do not drive or use machinery while on this mewdicxation as it can make you drowsy.   Follow up: Please follow up with Dr. Brown in 1-2 weeks. Please call [phone] to make an appointment.  If you develop fevers, chills, worsening pain, increased drainage from the wound, foul smelling drainage from the wound, nausea that won't subside, vomiting, or any other symptoms of concern, please call MD or return to the ED for further advice, evaluation, and/or treatment.

## 2025-02-01 NOTE — DISCHARGE NOTE PROVIDER - NSDCMRMEDTOKEN_GEN_ALL_CORE_FT
acetaminophen 325 mg oral tablet: 2 tab(s) orally every 6 hours  oxyCODONE 5 mg oral tablet: 1 tab(s) orally every 6 hours as needed for  severe pain MDD: 4 tablets  Unithroid 75 mcg (0.075 mg) oral tablet: 1 tab(s) orally once a day

## 2025-02-01 NOTE — PRE-OP CHECKLIST - TAMPON REMOVED
[facility] Cou[facility].    
n/a

## 2025-02-01 NOTE — DISCHARGE NOTE PROVIDER - CARE PROVIDER_API CALL
Abdoul Brown J  Surgery  [address] [facility] [address] [facility] [address] [place] [address]  Phone: [phone]  Fax: [phone]  Follow Up Time: 2 weeks    Sonia Boudreaux  Gastroenterology  [address] [place] [address]  Phone: [phone]  Fax: [phone]  Follow Up Time: 2 weeks

## 2025-02-01 NOTE — DISCHARGE NOTE PROVIDER - HOSPITAL COURSE
34 year old female with PMH hypothyroidism and 9 weeks postpartum presenting with 1 week upper abdominal and lower chest discomfort that has worsened overnight. She initially thought it was an anxiety attack but pain worsened in epigastrium prompting ED eval. She never had this before. She denies eating fatty foods. In Ed patient afebrile and HDS. Labs notable for AST/ALT in 550-600 range, Tbili 3.4, alk phos 308 all uptrending. RUQ u/s showed cholelithiasis without signs of cholecystitis, CBD 7mm. CTA chest negative for PE. Surgery consulted for eval of choledocholithiasis.     Advanced GI consulted for possible scope. Patient underwent ERCP with biliary sphincterotomy and CBD stone removal on 1/30/25. Follow up outpatient in 4 weeks with GI team.     Patient went to the OR on 2/1/25 for robotic laparoscopic cholecystectomy with umbilical hernia repair. Patient tolerated procedure well without any complications. She is tolerating clear liquid diet, ambulating, and hemodynamically stable. Post-op labs were stable. Patient is cleared for discharge home.

## 2025-02-01 NOTE — BRIEF OPERATIVE NOTE - OPERATION/FINDINGS
robotic assisted laparoscopic cholecystectomy with umbilical hernia repair (without mesh)  critical view obtained, hemostasis achieved

## 2025-02-01 NOTE — DISCHARGE NOTE PROVIDER - NSDCFUSCHEDAPPT_GEN_ALL_CORE_FT
Sonia Boudreaux  [facility]  GASTRO Doc Off [id number]  Scheduled Appointment: 02/25/2025

## 2025-02-01 NOTE — DISCHARGE NOTE PROVIDER - PROVIDER TOKENS
PROVIDER:[TOKEN:[[id number]],FOLLOWUP:[2 weeks]],PROVIDER:[TOKEN:[[id number]],FOLLOWUP:[2 weeks]]

## 2025-02-01 NOTE — DISCHARGE NOTE PROVIDER - CARE PROVIDERS DIRECT ADDRESSES
[email or web address]

## 2025-02-01 NOTE — PRE-ANESTHESIA EVALUATION ADULT - NSANTHOSAYNRD_GEN_A_CORE
No. ROHAN screening performed.  STOP BANG Legend: 0-2 = LOW Risk; 3-4 = INTERMEDIATE Risk; 5-8 = HIGH Risk
No. ROHAN screening performed.  STOP BANG Legend: 0-2 = LOW Risk; 3-4 = INTERMEDIATE Risk; 5-8 = HIGH Risk

## 2025-02-01 NOTE — BRIEF OPERATIVE NOTE - NSICDXBRIEFPOSTOP_GEN_ALL_CORE_FT
POST-OP DIAGNOSIS:  Acute calculous cholecystitis 01-Feb-2025 19:52:02  Connie Landeros  Umbilical hernia 01-Feb-2025 19:52:15  Connie Landeros

## 2025-02-01 NOTE — PROGRESS NOTE ADULT - ATTENDING COMMENTS
Plan for OR likely tomorrow with Dr. Delgado.
CLAY MUÑOZ is a 34yF w/ PMHx of hypothyroidism and 9 weeks postpartum presenting with 1 week abdominal pain worsening in past 24 hours, poor PO tolerance. Labs notable for transaminitis with CBD 7mm on u/s.     PLAN:   - Pt going to OR 2/1  - Keep NPO for now  - IVF while NPO  - monitor LFTs  - daily labs, replete electrolytes as needed   - multi modal pain control  - DVT ppx   - encourage ambulation and IS as tolerated
Patient is a 34 year old female with past medical history hypothyroidism, 9 weeks postpartum, who presents with abdominal pain, admitted for acute symptomatic cholelithiasis    #Cholicy abdominal pain   #Gall bladder stones   #Choledocholithiasis s/p ERCP w/ Stone extraction by GI   #Transaminitis 2/2 Stones resolving   -RUQ US-Cholelithiasis without evidence of cholecystitis, few hepatic cysts up to 2.1 cm, CBD dilation of 7mm  -[facility]vance GI cs-ERCP with EUS this afternoon- f/up post op   -NPO w IV fluids  -Gen surgical consult for plan of cholecystectomy - Add on today   -Daily LFT's monitoring     #Hypothyroidism -C/w home unithyroid 75mcg po once daily     GI /DVT proph.     Code status: full code     Total time spent to complete patient's bedside assessment, review medical chart, discuss medical plan of care with covering medical team was more than 35 minutes with >50% of time spent face to face with patient, discussion with patient/family and/or coordination of care .
Patient is a 34 year old female with past medical history hypothyroidism, 9 weeks postpartum, who presents with abdominal pain, admitted for acute symptomatic cholelithiasis    #Post-Purtum 2 months now - new Mom and excited / not currently breast feeding   #Cholic Abdominal Pain   #Gall bladder stones   #Choledocholithiasis s/p ERCP w/ Stone extraction by GI   #Transaminitis 2/2 Stones resolving   -RUQ US-Cholelithiasis without evidence of cholecystitis, few hepatic cysts up to 2.1 cm, CBD dilation of 7mm  -[facility] GI cs-ERCP with EUS this afternoon- f/up post op   -NPO w IV fluids  -Gen surgical consult for plan of cholecystectomy - Add on today   -Daily LFT's monitoring     #Hypothyroidism -C/w home synthyroid 75mcg po once daily     #Social: Pt very emotional today and upset about how she was addressed by our surgical team. Tried my best to console her. Pt interested in speaking to SW and Patient Experience. She is interested in getting the surgery before leaving the hospital however she might go AMA if no surgery today - pt is an add on case today and yesterday. Surgery team trying to accommodate for surgery/care.     GI /DVT proph     Code status: full code   Total time spent to complete patient's bedside assessment, review medical chart, discuss medical plan of care with covering medical team was more than 55 minutes with >50% of time spent face to face with patient, discussion with patient/family and/or coordination of care .

## 2025-02-01 NOTE — PROGRESS NOTE ADULT - SUBJECTIVE AND OBJECTIVE BOX
SUBJECTIVE:    Patient is a 34y old Female who presents with a chief complaint of abdominal pain (01 Feb 2025 00:21)  Currently admitted to medicine with the primary diagnosis of Cholelithiasis   Today is hospital day 2d. This morning she is resting comfortably in bed and reports no new issues or overnight events.     PAST MEDICAL & SURGICAL HISTORY  Hypothyroid    No significant past surgical history      SOCIAL HISTORY:  Negative for smoking/alcohol/drug use.     ALLERGIES:  No Known Allergies    MEDICATIONS:  STANDING MEDICATIONS  dextrose 5% + sodium chloride 0.9%. 1000 milliLiter(s) IV Continuous <Continuous>  enoxaparin Injectable 40 milliGRAM(s) SubCutaneous every 24 hours  influenza   Vaccine 0.5 milliLiter(s) IntraMuscular once  levothyroxine 75 MICROGram(s) Oral daily    PRN MEDICATIONS  acetaminophen     Tablet .. 650 milliGRAM(s) Oral every 6 hours PRN  ondansetron Injectable 4 milliGRAM(s) IV Push every 6 hours PRN    VITALS:   T(F): 99.4  HR: 62  BP: 92/60  RR: 18  SpO2: 99%    LABS:                        11.2   5.43  )-----------( 213      ( 01 Feb 2025 01:41 )             35.7     02-01    141  |  107  |  9[L]  ----------------------------<  104[H]  3.9   |  22  |  0.7    Ca    8.3[L]      01 Feb 2025 01:41  Phos  3.6     02-01  Mg     1.7     02-01    TPro  5.2[L]  /  Alb  3.3[L]  /  TBili  3.8[H]  /  DBili  x   /  AST  288[H]  /  ALT  497[H]  /  AlkPhos  316[H]  01-31    PT/INR - ( 01 Feb 2025 01:41 )   PT: 12.60 sec;   INR: 1.07 ratio         PTT - ( 01 Feb 2025 01:41 )  PTT:33.0 sec  Urinalysis Basic - ( 01 Feb 2025 01:41 )    Color: x / Appearance: x / SG: x / pH: x  Gluc: 104 mg/dL / Ketone: x  / Bili: x / Urobili: x   Blood: x / Protein: x / Nitrite: x   Leuk Esterase: x / RBC: x / WBC x   Sq Epi: x / Non Sq Epi: x / Bacteria: x                RADIOLOGY:    PHYSICAL EXAM:  GEN: No acute distress  LUNGS: Clear to auscultation bilaterally   HEART: Regular  ABD: Soft, non-tender, non-distended.  EXT: NC/NC/NE/2+PP/ALONZO/Skin Intact.   NEURO: AAOX3    Intravenous access:   NG tube:   Sahu Catheter:       
 GENERAL SURGERY PROGRESS NOTE     Patient: CLAY MUÑOZ , 34y ([birth year])Female   MRN: [id number]  Location: [address]  Visit: 01-30-25 Inpatient  Date: 02-01-25 @ 00:22        Admitted :01-30-25 (2d)  LOS: 2d    Procedure/Dx/Injuries: Cholelithiasis         Events of past 24 hours: Patieent is going to OR for cholecystectomy on 2/1. NPO at [facility] and preopped. No nausea or vomiting.  >>> <<<>>> <<<>>> <<<>>> <<<>>> <<<>>> <<<>>> <<<>>> <<<>>> <<<>>> <<<    Vitals:   T(F): 98.5 (01-31-25 @ 20:30), Max: 98.5 (01-31-25 @ 20:30)  HR: 72 (01-31-25 @ 20:30)  BP: 94/65 (01-31-25 @ 20:30)  RR: 18 (01-31-25 @ 20:30)  SpO2: 99% (01-31-25 @ 20:30)      PHYSICAL EXAM:  GENERAL: NAD  CHEST/LUNG: Equal chest rise B/L  HEART: Regular rate and rhythm  ABDOMEN: Soft, Mildly tender to palpation in RUQ, Nondistended;   EXTREMITIES:  No clubbing, cyanosis, or edema    >>> <<<>>> <<<>>> <<<>>> <<<>>> <<<>>> <<<>>> <<<>>> <<<>>> <<<>>> <<<   Is & Os:   Diet, NPO after Midnight:      NPO Start Date: 31-Jan-2025,   NPO Start Time: 23:59  Diet, Regular:   Low Fat (LOWFAT)    Fluids:     01-30-25 @ 07:01  -  01-31-25 @ 07:00  --------------------------------------------------------  IN:    dextrose 5% + sodium chloride 0.9%: 225 mL    Lactated Ringers: 210 mL  Total IN: 435 mL    OUT:  Total OUT: 0 mL    Total NET: 435 mL      >>> <<<>>> <<<>>> <<<>>> <<<>>> <<<>>> <<<>>> <<<>>> <<<>>> <<<>>> <<<    MEDICATIONS  (STANDING):  dextrose 5% + sodium chloride 0.9%. 1000 milliLiter(s) (75 mL/Hr) IV Continuous <Continuous>  enoxaparin Injectable 40 milliGRAM(s) SubCutaneous every 24 hours  influenza   Vaccine 0.5 milliLiter(s) IntraMuscular once  levothyroxine 75 MICROGram(s) Oral daily    MEDICATIONS  (PRN):  acetaminophen     Tablet .. 650 milliGRAM(s) Oral every 6 hours PRN Temp greater or equal to 38C (100.4F), Mild Pain (1 - 3)  ondansetron Injectable 4 milliGRAM(s) IV Push every 6 hours PRN Nausea and/or Vomiting      DVT PROPHYLAXIS: enoxaparin Injectable 40 milliGRAM(s) SubCutaneous every 24 hours    GI PROPHYLAXIS:   ANTICOAGULATION:   ANTIBIOTICS:      >>> <<<>>> <<<>>> <<<>>> <<<>>> <<<>>> <<<>>> <<<>>> <<<>>> <<<>>> <<<        LAB/STUDIES:  Labs:  CAPILLARY BLOOD GLUCOSE                              11.4   4.81  )-----------( 210      ( 31 Jan 2025 06:54 )             35.9       Auto Neutrophil %: 61.4 % (01-31-25 @ 06:54)  Auto Immature Granulocyte %: 0.6 % (01-31-25 @ 06:54)    01-31    141  |  111[H]  |  6[L]  ----------------------------<  96  4.0   |  22  |  0.7      Calcium: 8.2 mg/dL (01-31-25 @ 06:54)      LFTs:             5.2  | 3.8  | 288      ------------------[316     ( 31 Jan 2025 06:54 )  3.3  | x    | 497         Lipase:x      Amylase:x             Coags:            Urinalysis Basic - ( 31 Jan 2025 06:54 )    Color: x / Appearance: x / SG: x / pH: x  Gluc: 96 mg/dL / Ketone: x  / Bili: x / Urobili: x   Blood: x / Protein: x / Nitrite: x   Leuk Esterase: x / RBC: x / WBC x   Sq Epi: x / Non Sq Epi: x / Bacteria: x
GENERAL SURGERY PROGRESS NOTE     CLAY MUÑOZ  [address] [place] [address] day :2d    POD:  Procedure:   Surgical Attending: Abdoul Brown  Overnight events: Pt is now S/P ERCP with sphincterotomy and removal of CBD stone with Adv GI on 1/30. T bili now 3.8 from 3.4. Remainder of LFTs down trending.     T(F): 97.9 (01-31-25 @ 05:00), Max: 98 (01-30-25 @ 20:38)  HR: 55 (01-31-25 @ 06:21) (50 - 84)  BP: 91/55 (01-31-25 @ 06:21) (90/50 - 129/70)  ABP: --  ABP(mean): --  RR: 18 (01-31-25 @ 06:21) (16 - 19)  SpO2: 96% (01-31-25 @ 05:00) (96% - 100%)    IN'S / OUT's:    01-30-25 @ 07:01  -  01-31-25 @ 07:00  --------------------------------------------------------  IN:    dextrose 5% + sodium chloride 0.9%: 225 mL    Lactated Ringers: 210 mL  Total IN: 435 mL    OUT:  Total OUT: 0 mL    Total NET: 435 mL          PHYSICAL EXAM:  GENERAL: NAD  CHEST/LUNG: Equal chest rise B/L  HEART: Regular rate and rhythm  ABDOMEN: Soft, Mildly tender to palpation in RUQ, Nondistended;   EXTREMITIES:  No clubbing, cyanosis, or edema      LABS  Labs:  CAPILLARY BLOOD GLUCOSE                              11.4   4.81  )-----------( 210      ( 31 Jan 2025 06:54 )             35.9       Auto Neutrophil %: 61.4 % (01-31-25 @ 06:54)  Auto Immature Granulocyte %: 0.6 % (01-31-25 @ 06:54)    01-31    141  |  111[H]  |  6[L]  ----------------------------<  96  4.0   |  22  |  0.7      Calcium: 8.2 mg/dL (01-31-25 @ 06:54)      LFTs:             5.2  | 3.8  | 288      ------------------[316     ( 31 Jan 2025 06:54 )  3.3  | x    | 497         Lipase:x      Amylase:x             Coags:            Urinalysis Basic - ( 31 Jan 2025 06:54 )    Color: x / Appearance: x / SG: x / pH: x  Gluc: 96 mg/dL / Ketone: x  / Bili: x / Urobili: x   Blood: x / Protein: x / Nitrite: x   Leuk Esterase: x / RBC: x / WBC x   Sq Epi: x / Non Sq Epi: x / Bacteria: x            RADIOLOGY & ADDITIONAL TESTS:      A/P:  CLAY MUÑOZ is a 34yF w/ PMHx of hypothyroidism and 9 weeks postpartum presenting with 1 week abdominal pain worsening in past 24 hours, poor PO tolerance. Labs notable for transaminitis with CBD 7mm on u/s.       PLAN:   - Pt added on to OR for Robot possible laparoscopic cholecystectomy today 1/31 pending OR availability  - Keep NPO for now  - IVF while NPO  - monitor LFTs  - daily labs, replete electrolytes as needed   - multi modal pain control  - DVT ppx   - encourage ambulation and IS as tolerated       #DVT ppx: enoxaparin Injectable 40 milliGRAM(s) SubCutaneous every 24 hours    Disposition:  3A    Above plan to be discussed with Attending Surgeon Dr. Brown  , patient, patient family, and rest of health care team    TAP (Trauma, Acute care, Pediatrics) Spectra [phone]    
SUBJECTIVE:    Patient is a 34y old Female who presents with a chief complaint of abdominal pain (31 Jan 2025 10:00)  Currently admitted to medicine with the primary diagnosis of Cholelithiasis   Today is hospital day 1d. This morning she is resting comfortably in bed and reports no new issues or overnight events.     PAST MEDICAL & SURGICAL HISTORY  Hypothyroid    No significant past surgical history      SOCIAL HISTORY:  Negative for smoking/alcohol/drug use.     ALLERGIES:  No Known Allergies    MEDICATIONS:  STANDING MEDICATIONS  dextrose 5% + sodium chloride 0.9%. 1000 milliLiter(s) IV Continuous <Continuous>  enoxaparin Injectable 40 milliGRAM(s) SubCutaneous every 24 hours  influenza   Vaccine 0.5 milliLiter(s) IntraMuscular once  levothyroxine 75 MICROGram(s) Oral daily    PRN MEDICATIONS  acetaminophen     Tablet .. 650 milliGRAM(s) Oral every 6 hours PRN  ondansetron Injectable 4 milliGRAM(s) IV Push every 6 hours PRN    VITALS:   T(F): 97.8  HR: 55  BP: 97/59  RR: 16  SpO2: 100%    LABS:                        11.4   4.81  )-----------( 210      ( 31 Jan 2025 06:54 )             35.9     01-31    141  |  111[H]  |  6[L]  ----------------------------<  96  4.0   |  22  |  0.7    Ca    8.2[L]      31 Jan 2025 06:54  Mg     1.8     01-31    TPro  5.2[L]  /  Alb  3.3[L]  /  TBili  3.8[H]  /  DBili  x   /  AST  288[H]  /  ALT  497[H]  /  AlkPhos  316[H]  01-31      Urinalysis Basic - ( 31 Jan 2025 06:54 )    Color: x / Appearance: x / SG: x / pH: x  Gluc: 96 mg/dL / Ketone: x  / Bili: x / Urobili: x   Blood: x / Protein: x / Nitrite: x   Leuk Esterase: x / RBC: x / WBC x   Sq Epi: x / Non Sq Epi: x / Bacteria: x                RADIOLOGY:    PHYSICAL EXAM:  GEN: No acute distress  LUNGS: Clear to auscultation bilaterally   HEART: Regular  ABD: Soft, non-tender, non-distended.  EXT: NC/NC/NE/2+PP/ALONZO/Skin Intact.   NEURO: AAOX3  
Patient is a 35 y/o female with a past medical history of  hypothyroidism, 9 weeks postpartum, who presented to [facility] with abdominal pain. Patient s/p ERCP with sphincterotomy, remvoal of stone and sludge along with ductal irrigation. No stent was placed. She feels much better today and denies abdominal pain, nausea, emesis, fevers, or Melena. Possibly going to OR today for CCY.       PAST MEDICAL & SURGICAL HISTORY:  Hypothyroid  No significant past surgical history      MEDICATIONS  (STANDING):  enoxaparin Injectable 40 milliGRAM(s) SubCutaneous every 24 hours  influenza   Vaccine 0.5 milliLiter(s) IntraMuscular once  levothyroxine 75 MICROGram(s) Oral daily      Allergies  No Known Allergies      Review of Systems  General:  Denies Fatigue, Denies Fever, Denies Weakness ,Denies Weight Loss   HEENT: Denies Trouble Swallowing ,Denies  Sore Throat , Denies Change in hearing/vision/speech ,Denies Dizziness    Cardio: Denies  Chest Pain , Palpitations    Respiratory: Denies worsening of SOB, Denies Cough  Abdomen: See detailed HPI  Neuro: Denies Headache Denies Dizziness, Denies Paresthesias  MSK: Denies pain in Bones/Joints/Muscles   Psych: Patient denies depression, denies suicidal or homicidal ideations  Integ: Patient Denies rash, or new skin lesions       Vital Signs Last 24 Hrs  T(C): 36.6 (31 Jan 2025 05:00), Max: 36.7 (30 Jan 2025 12:57)  T(F): 97.9 (31 Jan 2025 05:00), Max: 98 (30 Jan 2025 20:38)  HR: 55 (31 Jan 2025 06:21) (50 - 84)  BP: 91/55 (31 Jan 2025 06:21) (90/50 - 129/70)  BP(mean): 77 (30 Jan 2025 12:57) (77 - 77)  RR: 18 (31 Jan 2025 06:21) (16 - 19)  SpO2: 96% (31 Jan 2025 05:00) (96% - 100%)    Parameters below as of 31 Jan 2025 05:00  Patient On (Oxygen Delivery Method): room air      Physical Exam  Gen: NAD  Head: NC/AT, no visible deformity  ENT: PERRLA,  Cardio: S1/S2 No S3/S4, Regular  Resp: CTA B/L  Abdomen: Soft, ND/ NT  Neuro: AAOx3  Extremities: FROM x 4  Skin: No jaundice, no excoriation       Labs:                        11.4   4.81  )-----------( 210      ( 31 Jan 2025 06:54 )             35.9     01-31    141  |  111[H]  |  6[L]  ----------------------------<  96  4.0   |  22  |  0.7    Ca    8.2[L]      31 Jan 2025 06:54  Mg     1.8     01-31    TPro  5.2[L]  /  Alb  3.3[L]  /  TBili  3.8[H]  /  DBili  x   /  AST  288[H]  /  ALT  497[H]  /  AlkPhos  316[H]  01-31      RADIOLOGY & ADDITIONAL STUDIES:  US Abdomen Upper Quadrant Right 01.30.25  IMPRESSION:  Cholelithiasis without sonographic evidence of acute cholecystitis.    Few hepatic cysts measuring up to 2.1 cm in the right lobe.    CBD just above the upper limits of normal, measuring 7 mm.

## 2025-02-01 NOTE — BRIEF OPERATIVE NOTE - NSICDXBRIEFPREOP_GEN_ALL_CORE_FT
PRE-OP DIAGNOSIS:  Choledocholithiasis with acute cholecystitis 01-Feb-2025 19:51:31  Connie Landeros  Umbilical hernia 01-Feb-2025 19:52:23  Connie Landeros

## 2025-02-02 VITALS
SYSTOLIC BLOOD PRESSURE: 124 MMHG | OXYGEN SATURATION: 97 % | HEART RATE: 67 BPM | TEMPERATURE: 98 F | RESPIRATION RATE: 18 BRPM | DIASTOLIC BLOOD PRESSURE: 80 MMHG

## 2025-02-02 LAB
HCT VFR BLD CALC: 40.5 % — SIGNIFICANT CHANGE UP (ref 37–47)
HGB BLD-MCNC: 12.9 G/DL — SIGNIFICANT CHANGE UP (ref 12–16)
MCHC RBC-ENTMCNC: 24.9 PG — LOW (ref 27–31)
MCHC RBC-ENTMCNC: 31.9 G/DL — LOW (ref 32–37)
MCV RBC AUTO: 78.2 FL — LOW (ref 81–99)
NRBC # BLD: 0 /100 WBCS — SIGNIFICANT CHANGE UP (ref 0–0)
NRBC BLD-RTO: 0 /100 WBCS — SIGNIFICANT CHANGE UP (ref 0–0)
PLATELET # BLD AUTO: 271 K/UL — SIGNIFICANT CHANGE UP (ref 130–400)
PMV BLD: SIGNIFICANT CHANGE UP (ref 7.4–10.4)
RBC # BLD: 5.18 M/UL — SIGNIFICANT CHANGE UP (ref 4.2–5.4)
RBC # FLD: 19.6 % — HIGH (ref 11.5–14.5)
TROPONIN T, HIGH SENSITIVITY RESULT: 7 NG/L — SIGNIFICANT CHANGE UP (ref 6–13)
WBC # BLD: 11.11 K/UL — HIGH (ref 4.8–10.8)
WBC # FLD AUTO: 11.11 K/UL — HIGH (ref 4.8–10.8)

## 2025-02-02 PROCEDURE — 47563 LAPARO CHOLECYSTECTOMY/GRAPH: CPT

## 2025-02-02 PROCEDURE — 93010 ELECTROCARDIOGRAM REPORT: CPT

## 2025-02-02 RX ORDER — KETOROLAC TROMETHAMINE 10 MG
15 TABLET ORAL ONCE
Refills: 0 | Status: COMPLETED | OUTPATIENT
Start: 2025-02-02 | End: 2025-02-02

## 2025-02-02 RX ORDER — HYDROMORPHONE HYDROCHLORIDE 4 MG/ML
0.5 INJECTION, SOLUTION INTRAMUSCULAR; INTRAVENOUS; SUBCUTANEOUS ONCE
Refills: 0 | Status: DISCONTINUED | OUTPATIENT
Start: 2025-02-02 | End: 2025-02-02

## 2025-02-02 RX ORDER — IBUPROFEN 600 MG/1
400 TABLET, FILM COATED ORAL EVERY 8 HOURS
Refills: 0 | Status: DISCONTINUED | OUTPATIENT
Start: 2025-02-02 | End: 2025-02-02

## 2025-02-02 RX ADMIN — ACETAMINOPHEN 650 MILLIGRAM(S): 160 SUSPENSION ORAL at 11:44

## 2025-02-02 RX ADMIN — HYDROMORPHONE HYDROCHLORIDE 0.5 MILLIGRAM(S): 4 INJECTION, SOLUTION INTRAMUSCULAR; INTRAVENOUS; SUBCUTANEOUS at 12:59

## 2025-02-02 RX ADMIN — ACETAMINOPHEN 650 MILLIGRAM(S): 160 SUSPENSION ORAL at 06:53

## 2025-02-02 RX ADMIN — ACETAMINOPHEN 650 MILLIGRAM(S): 160 SUSPENSION ORAL at 18:21

## 2025-02-02 RX ADMIN — ACETAMINOPHEN 650 MILLIGRAM(S): 160 SUSPENSION ORAL at 01:39

## 2025-02-02 RX ADMIN — SODIUM CHLORIDE 120 MILLILITER(S): 9 INJECTION, SOLUTION INTRAVENOUS at 07:58

## 2025-02-02 RX ADMIN — ACETAMINOPHEN 650 MILLIGRAM(S): 160 SUSPENSION ORAL at 12:14

## 2025-02-02 RX ADMIN — ACETAMINOPHEN 650 MILLIGRAM(S): 160 SUSPENSION ORAL at 06:18

## 2025-02-02 RX ADMIN — HYDROMORPHONE HYDROCHLORIDE 0.5 MILLIGRAM(S): 4 INJECTION, SOLUTION INTRAMUSCULAR; INTRAVENOUS; SUBCUTANEOUS at 13:30

## 2025-02-02 RX ADMIN — SODIUM CHLORIDE 120 MILLILITER(S): 9 INJECTION, SOLUTION INTRAVENOUS at 01:40

## 2025-02-02 RX ADMIN — IBUPROFEN 400 MILLIGRAM(S): 600 TABLET, FILM COATED ORAL at 17:23

## 2025-02-02 RX ADMIN — ACETAMINOPHEN 650 MILLIGRAM(S): 160 SUSPENSION ORAL at 02:15

## 2025-02-02 NOTE — DISCHARGE NOTE NURSING/CASE MANAGEMENT/SOCIAL WORK - FINANCIAL ASSISTANCE
[facility] provides services at a reduced cost to those who are determined to be eligible through [facility]’s financial assistance program. Information regarding [facility]’s financial assistance program can be found by going to [email or web address] or by calling [phone].

## 2025-02-02 NOTE — PROVIDER CONTACT NOTE (OTHER) - ACTION/TREATMENT ORDERED:
MD Ortega notified to come to bedside to assess patient. Transportation was cancelled and discharge was delayed.
MD Ortega notified and ordered STAT EKG, STAT lab troponin, Dialudid 0.5mg.

## 2025-02-02 NOTE — CHART NOTE - NSCHARTNOTEFT_GEN_A_CORE
PACU ANESTHESIA ADMISSION NOTE      Procedure:   Post op diagnosis:      ____  Intubated  TV:______       Rate: ______      FiO2: ______    __x__  Patent Airway    __x__  Full return of protective reflexes    __x__  Full recovery from anesthesia / back to baseline status    Vitals:  T(C): 36.7 (02-01-25 @ 17:29), Max: 37.4 (02-01-25 @ 05:00)  HR: 108 (02-01-25 @ 17:29) (62 - 108)  BP: 121/78 (02-01-25 @ 17:29) (92/60 - 121/78)  RR: 17 (02-01-25 @ 17:29) (17 - 18)  SpO2: 100% (02-01-25 @ 17:29) (98% - 100%)    Mental Status:  __x__ Awake   ___x__ Alert   _____ Drowsy   _____ Sedated    Nausea/Vomiting:  __x__ NO  ______Yes,   See Post - Op Orders          Pain Scale (0-10):  _____    Treatment: ____ None    __x__ See Post - Op/PCA Orders    Post - Operative Fluids:   ____ Oral   __x__ See Post - Op Orders    Plan: Discharge:   ____Home       _____Floor     _____Critical Care    _____  Other:_________________    Comments: Patient had smooth intraoperative event, no anesthesia complication.  PACU Vital signs: HR:  65          BP:   120     /  82        RR: 14            O2 Sat:     99  %     Temp 97.5
Pt noticed having blood on their marilee as well as while whiping. Reports having vaginal bleeding 2 weeks ago. Pt is asymptomatic. Vitals stable. HGB stable. Pt reported will follow with op gynecologist after discharge
Transfer from: Medicine    Transfer to: Surgery    Approved by: Dr. Miranda    HPI:   34 year old female, past medical history hypothyroidism, 9 weeks postpartum, who presents with abdominal pain since 1 week. Pain was  was in the epigastric region, non-radiating and associated with food intake. Denies fever,  hemoptysis, shortness of breath, vomiting, diarrhea.  vitals were unremarkable  Labs significant for , , , T bili 2.6  RUQ US -Cholelithiasis without evidence of cholecystitis, few hepatic cysts up to 2.1 cm, CBD dilation of 7mm (01-30-25 @ 09:30)      HOSPITAL COURSE:  34 year old female, past medical history hypothyroidism, 9 weeks postpartum, who presents with abdominal pain, admitted for cholelithiasis. Seen by GI, s/p EUS/ERCP w/ sphincterotomy, removal of stone and sludge along with ductal irrigation. No stent was placed. Surgery consulted for CCY, went for OR 2/1/2025    #Cholelithiasis  #choledocholithiasis  #Transaminitis  #RUQ pain  - S/p ERCP and CBD stone removal 1/30/2025  - RUQ US: Cholelithiasis without evidence of cholecystitis, few hepatic cysts up to 2.1 cm, CBD dilation of 7mm  - Cholecystectomy by surgery today 2/1/2025  - resume diet as per surgery    #Hypothyroidism  -Cw home synthroid 75    #Diet: NPO until procedure, resume diet per surgery  #GI Prophylaxis: none  #DVT Prophylaxis:  Lovenox 40mg SubQ qHS  #Activity: Ambulate as tolerated      FOR FOLLOW UP:  [ ] post op labs/diet per surgery
called by RN for pt having chest pain. Pt reported similar pain in the AM with EKG ordered which was wnl, Troponin was 7. Pt was given hydromorphone with resolution of the pain. Upon discharge, pt complained of severe cp, predominantly in the RUQ radiating to the epigastrium. Pts vitals were stable, although this is likely related to their recent laparoscopic surgery. Spoke with surgery and they confirmed that this is likely d/t to recent operation, no CTA PE required

## 2025-02-02 NOTE — DISCHARGE NOTE NURSING/CASE MANAGEMENT/SOCIAL WORK - PATIENT PORTAL LINK FT
You can access the FollowMyHealth Patient Portal offered by [facility] by registering at the following website: [email or web address]. By joining [employer]’s FollowMyHealth portal, you will also be able to view your health information using other applications (apps) compatible with our system.

## 2025-02-02 NOTE — PROVIDER CONTACT NOTE (OTHER) - SITUATION
Pt was discharged and ready to leave via wheelchair to the main lobby, but started experiencing severe RUQ pain and shortness of breath upon changing clothing

## 2025-02-02 NOTE — CHART NOTE - NSCHARTNOTESELECT_GEN_ALL_CORE
Pleuritic chest pain/Event Note
Transfer Note
Anesthesia Transfer/Event Note
Vaginal Bleeding/Event Note

## 2025-02-10 DIAGNOSIS — K42.9 UMBILICAL HERNIA WITHOUT OBSTRUCTION OR GANGRENE: ICD-10-CM

## 2025-02-10 DIAGNOSIS — K59.00 CONSTIPATION, UNSPECIFIED: ICD-10-CM

## 2025-02-10 DIAGNOSIS — Z79.890 HORMONE REPLACEMENT THERAPY: ICD-10-CM

## 2025-02-10 DIAGNOSIS — K76.89 OTHER SPECIFIED DISEASES OF LIVER: ICD-10-CM

## 2025-02-10 DIAGNOSIS — K80.62 CALCULUS OF GALLBLADDER AND BILE DUCT WITH ACUTE CHOLECYSTITIS WITHOUT OBSTRUCTION: ICD-10-CM

## 2025-02-10 DIAGNOSIS — K29.70 GASTRITIS, UNSPECIFIED, WITHOUT BLEEDING: ICD-10-CM

## 2025-02-10 DIAGNOSIS — E03.9 HYPOTHYROIDISM, UNSPECIFIED: ICD-10-CM

## 2025-02-10 DIAGNOSIS — K83.8 OTHER SPECIFIED DISEASES OF BILIARY TRACT: ICD-10-CM

## 2025-02-20 ENCOUNTER — APPOINTMENT (OUTPATIENT)
Dept: SURGERY | Facility: CLINIC | Age: 35
End: 2025-02-20

## 2025-02-20 VITALS
SYSTOLIC BLOOD PRESSURE: 104 MMHG | BODY MASS INDEX: 23.41 KG/M2 | DIASTOLIC BLOOD PRESSURE: 68 MMHG | WEIGHT: 124 LBS | HEIGHT: 61 IN

## 2025-02-20 DIAGNOSIS — Z90.49 ACQUIRED ABSENCE OF OTHER SPECIFIED PARTS OF DIGESTIVE TRACT: ICD-10-CM

## 2025-02-20 PROCEDURE — 99024 POSTOP FOLLOW-UP VISIT: CPT

## 2025-02-25 ENCOUNTER — APPOINTMENT (OUTPATIENT)
Dept: GASTROENTEROLOGY | Facility: CLINIC | Age: 35
End: 2025-02-25
Payer: COMMERCIAL

## 2025-02-25 VITALS
HEIGHT: 60 IN | WEIGHT: 125.4 LBS | SYSTOLIC BLOOD PRESSURE: 109 MMHG | BODY MASS INDEX: 24.62 KG/M2 | HEART RATE: 79 BPM | DIASTOLIC BLOOD PRESSURE: 77 MMHG

## 2025-02-25 DIAGNOSIS — K76.89 OTHER SPECIFIED DISEASES OF LIVER: ICD-10-CM

## 2025-02-25 DIAGNOSIS — Z78.9 OTHER SPECIFIED HEALTH STATUS: ICD-10-CM

## 2025-02-25 DIAGNOSIS — G43.909 MIGRAINE, UNSPECIFIED, NOT INTRACTABLE, W/OUT STATUS MIGRAINOSUS: ICD-10-CM

## 2025-02-25 DIAGNOSIS — K80.20 CALCULUS OF GALLBLADDER W/OUT CHOLECYSTITIS W/OUT OBSTRUCTION: ICD-10-CM

## 2025-02-25 DIAGNOSIS — Z87.19 PERSONAL HISTORY OF OTHER DISEASES OF THE DIGESTIVE SYSTEM: ICD-10-CM

## 2025-02-25 DIAGNOSIS — Z86.39 PERSONAL HISTORY OF OTHER ENDOCRINE, NUTRITIONAL AND METABOLIC DISEASE: ICD-10-CM

## 2025-02-25 DIAGNOSIS — Z86.59 PERSONAL HISTORY OF OTHER MENTAL AND BEHAVIORAL DISORDERS: ICD-10-CM

## 2025-02-25 DIAGNOSIS — K80.50 CALCULUS OF BILE DUCT W/OUT CHOLANGITIS OR CHOLECYSTITIS W/OUT OBSTRUCTION: ICD-10-CM

## 2025-02-25 DIAGNOSIS — R79.89 OTHER SPECIFIED ABNORMAL FINDINGS OF BLOOD CHEMISTRY: ICD-10-CM

## 2025-02-25 DIAGNOSIS — Z86.2 PERSONAL HISTORY OF DISEASES OF THE BLOOD AND BLOOD-FORMING ORGANS AND CERTAIN DISORDERS INVOLVING THE IMMUNE MECHANISM: ICD-10-CM

## 2025-02-25 DIAGNOSIS — K59.00 CONSTIPATION, UNSPECIFIED: ICD-10-CM

## 2025-02-25 PROBLEM — Z90.49 S/P LAPAROSCOPIC CHOLECYSTECTOMY: Status: ACTIVE | Noted: 2025-02-25

## 2025-02-25 PROCEDURE — 99214 OFFICE O/P EST MOD 30 MIN: CPT

## 2025-02-25 RX ORDER — LEVOTHYROXINE SODIUM 50 UG/1
50 TABLET ORAL
Refills: 0 | Status: ACTIVE | COMMUNITY

## 2025-03-03 LAB
ALBUMIN SERPL ELPH-MCNC: 4.4 G/DL
ALP BLD-CCNC: 76 U/L
ALT SERPL-CCNC: 15 U/L
AST SERPL-CCNC: 14 U/L
BILIRUB DIRECT SERPL-MCNC: 0.2 MG/DL
BILIRUB INDIRECT SERPL-MCNC: 0.2 MG/DL
BILIRUB SERPL-MCNC: 0.4 MG/DL
PROT SERPL-MCNC: 6.9 G/DL

## 2025-03-04 ENCOUNTER — OUTPATIENT (OUTPATIENT)
Dept: OUTPATIENT SERVICES | Facility: HOSPITAL | Age: 35
LOS: 1 days | End: 2025-03-04
Payer: COMMERCIAL

## 2025-03-04 ENCOUNTER — RESULT REVIEW (OUTPATIENT)
Age: 35
End: 2025-03-04

## 2025-03-04 DIAGNOSIS — K76.89 OTHER SPECIFIED DISEASES OF LIVER: ICD-10-CM

## 2025-03-04 DIAGNOSIS — Z00.8 ENCOUNTER FOR OTHER GENERAL EXAMINATION: ICD-10-CM

## 2025-03-04 PROCEDURE — 74183 MRI ABD W/O CNTR FLWD CNTR: CPT | Mod: 26

## 2025-03-04 PROCEDURE — 74183 MRI ABD W/O CNTR FLWD CNTR: CPT

## 2025-03-04 PROCEDURE — A9579: CPT

## 2025-03-05 DIAGNOSIS — K76.89 OTHER SPECIFIED DISEASES OF LIVER: ICD-10-CM

## 2025-03-11 ENCOUNTER — NON-APPOINTMENT (OUTPATIENT)
Age: 35
End: 2025-03-11

## 2025-07-22 ENCOUNTER — APPOINTMENT (OUTPATIENT)
Dept: GASTROENTEROLOGY | Facility: CLINIC | Age: 35
End: 2025-07-22

## 2025-08-20 ENCOUNTER — APPOINTMENT (OUTPATIENT)
Dept: OBGYN | Facility: CLINIC | Age: 35
End: 2025-08-20
Payer: COMMERCIAL

## 2025-08-20 ENCOUNTER — NON-APPOINTMENT (OUTPATIENT)
Age: 35
End: 2025-08-20

## 2025-08-20 VITALS
HEIGHT: 60 IN | DIASTOLIC BLOOD PRESSURE: 68 MMHG | SYSTOLIC BLOOD PRESSURE: 94 MMHG | WEIGHT: 130 LBS | BODY MASS INDEX: 25.52 KG/M2

## 2025-08-20 DIAGNOSIS — E03.9 HYPOTHYROIDISM, UNSPECIFIED: ICD-10-CM

## 2025-08-20 DIAGNOSIS — S31.40XA UNSPECIFIED OPEN WOUND OF VAGINA AND VULVA, INITIAL ENCOUNTER: ICD-10-CM

## 2025-08-20 DIAGNOSIS — O09.819 SUPERVISION OF PREGNANCY RESULTING FROM ASSISTED REPRODUCTIVE TECHNOLOGY, UNSPECIFIED TRIMESTER: ICD-10-CM

## 2025-08-20 DIAGNOSIS — Z34.93 ENCOUNTER FOR SUPERVISION OF NORMAL PREGNANCY, UNSPECIFIED, THIRD TRIMESTER: ICD-10-CM

## 2025-08-20 DIAGNOSIS — Z34.81 ENCOUNTER FOR SUPERVISION OF OTHER NORMAL PREGNANCY, FIRST TRIMESTER: ICD-10-CM

## 2025-08-20 DIAGNOSIS — Z34.90 ENCOUNTER FOR SUPERVISION OF NORMAL PREGNANCY, UNSPECIFIED, UNSPECIFIED TRIMESTER: ICD-10-CM

## 2025-08-20 DIAGNOSIS — N91.1 SECONDARY AMENORRHEA: ICD-10-CM

## 2025-08-20 DIAGNOSIS — Z34.92 ENCOUNTER FOR SUPERVISION OF NORMAL PREGNANCY, UNSPECIFIED, SECOND TRIMESTER: ICD-10-CM

## 2025-08-20 PROCEDURE — 76817 TRANSVAGINAL US OBSTETRIC: CPT

## 2025-08-20 PROCEDURE — 36415 COLL VENOUS BLD VENIPUNCTURE: CPT

## 2025-08-20 PROCEDURE — 99214 OFFICE O/P EST MOD 30 MIN: CPT | Mod: 25

## 2025-08-21 LAB
ABORH: NORMAL
ANTIBODY SCREEN: NORMAL
BASOPHILS # BLD AUTO: 0.04 K/UL
BASOPHILS NFR BLD AUTO: 0.3 %
C TRACH RRNA SPEC QL NAA+PROBE: NOT DETECTED
EOSINOPHIL # BLD AUTO: 0.27 K/UL
EOSINOPHIL NFR BLD AUTO: 2.2 %
HBV SURFACE AG SERPL QL IA: NONREACTIVE
HCT VFR BLD CALC: 41.4 %
HCV RNA SERPL NAA+PROBE-LOG IU: NOT DETECTED LOGIU/ML
HEPC RNA INTERP: NOT DETECTED IU/ML
HGB BLD-MCNC: 13.2 G/DL
HIV1+2 AB SPEC QL IA.RAPID: NONREACTIVE
IMM GRANULOCYTES NFR BLD AUTO: 0.5 %
LYMPHOCYTES # BLD AUTO: 1.68 K/UL
LYMPHOCYTES NFR BLD AUTO: 13.8 %
MAN DIFF?: NORMAL
MCHC RBC-ENTMCNC: 26.8 PG
MCHC RBC-ENTMCNC: 31.9 G/DL
MCV RBC AUTO: 84.1 FL
MONOCYTES # BLD AUTO: 0.92 K/UL
MONOCYTES NFR BLD AUTO: 7.6 %
N GONORRHOEA RRNA SPEC QL NAA+PROBE: NOT DETECTED
NEUTROPHILS # BLD AUTO: 9.19 K/UL
NEUTROPHILS NFR BLD AUTO: 75.6 %
PLATELET # BLD AUTO: 303 K/UL
PMV BLD AUTO: 0 /100 WBCS
PMV BLD: 11 FL
RBC # BLD: 4.92 M/UL
RBC # FLD: 14.9 %
SOURCE AMPLIFICATION: NORMAL
T PALLIDUM AB SER QL IA: NEGATIVE
TSH SERPL-ACNC: 3.44 UIU/ML
WBC # FLD AUTO: 12.16 K/UL

## 2025-08-22 LAB
BACTERIA UR CULT: NORMAL
LEAD BLD-MCNC: <1 UG/DL

## 2025-08-24 LAB
MEV IGG FLD QL IA: 87.1 AU/ML
MEV IGG+IGM SER-IMP: POSITIVE
RUBV IGG FLD-ACNC: 21.8 INDEX
RUBV IGG SER-IMP: POSITIVE
VZV AB TITR SER: POSITIVE
VZV IGG SER IF-ACNC: 13.7 S/CO

## 2025-08-25 ENCOUNTER — APPOINTMENT (OUTPATIENT)
Dept: OBGYN | Facility: CLINIC | Age: 35
End: 2025-08-25
Payer: COMMERCIAL

## 2025-08-25 VITALS
WEIGHT: 131 LBS | HEART RATE: 85 BPM | DIASTOLIC BLOOD PRESSURE: 75 MMHG | SYSTOLIC BLOOD PRESSURE: 108 MMHG | BODY MASS INDEX: 25.72 KG/M2 | HEIGHT: 60 IN

## 2025-08-25 PROBLEM — Z34.90 PREGNANCY, UNSPECIFIED GESTATIONAL AGE: Status: ACTIVE | Noted: 2025-08-25

## 2025-08-25 LAB
BILIRUB UR QL STRIP: NORMAL
CLARITY UR: CLEAR
COLLECTION METHOD: NORMAL
GLUCOSE UR-MCNC: NORMAL
HCG UR QL: 0.2 EU/DL
HGB UR QL STRIP.AUTO: NORMAL
KETONES UR-MCNC: NORMAL
LEUKOCYTE ESTERASE UR QL STRIP: ABNORMAL
NITRITE UR QL STRIP: NORMAL
PH UR STRIP: 5
PROT UR STRIP-MCNC: NORMAL
SP GR UR STRIP: 1.03

## 2025-08-25 PROCEDURE — 0502F SUBSEQUENT PRENATAL CARE: CPT

## 2025-09-03 ENCOUNTER — APPOINTMENT (OUTPATIENT)
Dept: OBGYN | Facility: CLINIC | Age: 35
End: 2025-09-03
Payer: COMMERCIAL

## 2025-09-03 VITALS
WEIGHT: 130 LBS | BODY MASS INDEX: 25.52 KG/M2 | DIASTOLIC BLOOD PRESSURE: 70 MMHG | SYSTOLIC BLOOD PRESSURE: 106 MMHG | HEIGHT: 60 IN

## 2025-09-03 DIAGNOSIS — Z34.90 ENCOUNTER FOR SUPERVISION OF NORMAL PREGNANCY, UNSPECIFIED, UNSPECIFIED TRIMESTER: ICD-10-CM

## 2025-09-03 DIAGNOSIS — E03.9 HYPOTHYROIDISM, UNSPECIFIED: ICD-10-CM

## 2025-09-03 LAB
BILIRUB UR QL STRIP: NORMAL
GLUCOSE UR-MCNC: NORMAL
HCG UR QL: 0.2 EU/DL
HGB UR QL STRIP.AUTO: NORMAL
KETONES UR-MCNC: NORMAL
LEUKOCYTE ESTERASE UR QL STRIP: NORMAL
NITRITE UR QL STRIP: NORMAL
PH UR STRIP: 6.5
PROT UR STRIP-MCNC: NORMAL
SP GR UR STRIP: 1.01

## 2025-09-03 PROCEDURE — 0502F SUBSEQUENT PRENATAL CARE: CPT

## 2025-09-12 ENCOUNTER — APPOINTMENT (OUTPATIENT)
Dept: ANTEPARTUM | Facility: CLINIC | Age: 35
End: 2025-09-12
Payer: COMMERCIAL

## 2025-09-12 ENCOUNTER — ASOB RESULT (OUTPATIENT)
Age: 35
End: 2025-09-12

## 2025-09-12 VITALS
HEIGHT: 60 IN | BODY MASS INDEX: 25.52 KG/M2 | WEIGHT: 130 LBS | DIASTOLIC BLOOD PRESSURE: 69 MMHG | HEART RATE: 80 BPM | SYSTOLIC BLOOD PRESSURE: 102 MMHG

## 2025-09-12 DIAGNOSIS — O09.529 SUPERVISION OF ELDERLY MULTIGRAVIDA, UNSPECIFIED TRIMESTER: ICD-10-CM

## 2025-09-12 PROCEDURE — 36415 COLL VENOUS BLD VENIPUNCTURE: CPT

## 2025-09-12 PROCEDURE — 76813 OB US NUCHAL MEAS 1 GEST: CPT

## 2025-09-15 ENCOUNTER — TRANSCRIPTION ENCOUNTER (OUTPATIENT)
Age: 35
End: 2025-09-15

## 2025-09-16 ENCOUNTER — APPOINTMENT (OUTPATIENT)
Dept: OBGYN | Facility: CLINIC | Age: 35
End: 2025-09-16
Payer: COMMERCIAL

## 2025-09-16 VITALS
HEIGHT: 60 IN | WEIGHT: 134 LBS | DIASTOLIC BLOOD PRESSURE: 71 MMHG | SYSTOLIC BLOOD PRESSURE: 90 MMHG | BODY MASS INDEX: 26.31 KG/M2

## 2025-09-16 DIAGNOSIS — Z34.90 ENCOUNTER FOR SUPERVISION OF NORMAL PREGNANCY, UNSPECIFIED, UNSPECIFIED TRIMESTER: ICD-10-CM

## 2025-09-16 LAB
BILIRUB UR QL STRIP: NORMAL
CLARITY UR: CLEAR
COLLECTION METHOD: NORMAL
GLUCOSE UR-MCNC: NORMAL
HCG UR QL: 0.2 EU/DL
HGB UR QL STRIP.AUTO: NORMAL
KETONES UR-MCNC: NORMAL
LEUKOCYTE ESTERASE UR QL STRIP: NORMAL
NITRITE UR QL STRIP: NORMAL
PH UR STRIP: 5.5
PROT UR STRIP-MCNC: NORMAL
SP GR UR STRIP: 1.03

## 2025-09-16 PROCEDURE — 0502F SUBSEQUENT PRENATAL CARE: CPT

## 2025-09-20 LAB
5P15 DELETION (CRI-DU-CHAT): NOT DETECTED
CFDNA.FET/CFDNA.TOTAL SFR FETUS: NORMAL
CITATION REF LAB TEST: NORMAL
FET 13+18+21+X+Y ANEUP PLAS.CFDNA: NEGATIVE
FET 1P36 DEL RISK WBC.DNA+CFDNA QL: NOT DETECTED
FET 22Q11.2 DEL RISK WBC.DNA+CFDNA QL: NOT DETECTED
FET CHR 11Q23 DEL PLAS.CFDNA QL: NOT DETECTED
FET CHR 15Q11 DEL PLAS.CFDNA QL: NOT DETECTED
FET CHR 21 TS PLAS.CFDNA QL: NEGATIVE
FET CHR 4P16 DEL PLAS.CFDNA QL: NOT DETECTED
FET CHR 8Q24 DEL PLAS.CFDNA QL: NOT DETECTED
FET MS X RISK WBC.DNA+CFDNA QL: NOT DETECTED
FET SEX PLAS.CFDNA DOSAGE CFDNA: NORMAL
FET TS 13 RISK PLAS.CFDNA QL: NEGATIVE
FET TS 18 RISK WBC.DNA+CFDNA QL: NEGATIVE
FET X + Y ANEUP RISK PLAS.CFDNA SEQ-IMP: NOT DETECTED
GA EST FROM CONCEPTION DATE: NORMAL
GESTATIONAL AGE > OR = 9W:: YES
LAB DIRECTOR NAME PROVIDER: NORMAL
LAB DIRECTOR NAME PROVIDER: NORMAL
LABORATORY COMMENT REPORT: NORMAL
LIMITATIONS OF THE TEST: NORMAL
Lab: NOT DETECTED
Lab: NOT DETECTED
NEGATIVE PREDICTIVE VALUE: NORMAL
NOTE: NORMAL
PERFORMANCE CHARACTERISTICS: NORMAL
POSITIVE PREDICTIVE VALUE: NORMAL
REF LAB TEST METHOD: NORMAL
TEST PERFORMANCE INFO SPEC: NORMAL